# Patient Record
Sex: FEMALE | Race: BLACK OR AFRICAN AMERICAN | NOT HISPANIC OR LATINO | Employment: UNEMPLOYED | URBAN - METROPOLITAN AREA
[De-identification: names, ages, dates, MRNs, and addresses within clinical notes are randomized per-mention and may not be internally consistent; named-entity substitution may affect disease eponyms.]

---

## 2023-11-28 PROBLEM — I10 HYPERTENSION: Status: ACTIVE | Noted: 2023-11-28

## 2023-11-28 PROBLEM — I10 BENIGN ESSENTIAL HYPERTENSION: Status: ACTIVE | Noted: 2023-11-28

## 2023-11-28 PROBLEM — A59.9 TRICHIMONIASIS: Status: ACTIVE | Noted: 2023-11-28

## 2023-11-28 PROBLEM — F17.200 NICOTINE DEPENDENCE: Status: ACTIVE | Noted: 2023-11-28

## 2023-11-28 PROBLEM — N94.6 DYSMENORRHEA: Status: ACTIVE | Noted: 2023-11-28

## 2023-11-28 RX ORDER — FLUOCINONIDE 0.5 MG/G
1 CREAM TOPICAL
COMMUNITY
Start: 2015-05-13 | End: 2023-11-29 | Stop reason: WASHOUT

## 2023-11-28 RX ORDER — IBUPROFEN 600 MG/1
600 TABLET ORAL EVERY 6 HOURS
COMMUNITY
Start: 2022-10-10 | End: 2023-11-29 | Stop reason: SDUPTHER

## 2023-11-28 RX ORDER — METRONIDAZOLE 500 MG/1
500 TABLET ORAL 2 TIMES DAILY
COMMUNITY
Start: 2022-10-13 | End: 2023-11-29 | Stop reason: WASHOUT

## 2023-11-28 RX ORDER — NAPROXEN 500 MG/1
TABLET ORAL
COMMUNITY
End: 2023-11-29 | Stop reason: WASHOUT

## 2023-11-28 RX ORDER — HYDROCHLOROTHIAZIDE 25 MG/1
1 TABLET ORAL DAILY
COMMUNITY
Start: 2021-05-13 | End: 2023-11-29 | Stop reason: WASHOUT

## 2023-11-28 RX ORDER — HYDROCORTISONE 25 MG/G
1 OINTMENT TOPICAL
COMMUNITY
Start: 2015-05-13 | End: 2023-11-29 | Stop reason: WASHOUT

## 2023-11-28 RX ORDER — PNV NO.95/FERROUS FUM/FOLIC AC 28MG-0.8MG
1 TABLET ORAL DAILY
COMMUNITY
Start: 2021-05-13 | End: 2023-11-29 | Stop reason: WASHOUT

## 2023-11-29 ENCOUNTER — OFFICE VISIT (OUTPATIENT)
Dept: PRIMARY CARE | Facility: HOSPITAL | Age: 38
End: 2023-11-29
Payer: COMMERCIAL

## 2023-11-29 VITALS
BODY MASS INDEX: 28.85 KG/M2 | OXYGEN SATURATION: 99 % | HEIGHT: 64 IN | DIASTOLIC BLOOD PRESSURE: 99 MMHG | HEART RATE: 87 BPM | SYSTOLIC BLOOD PRESSURE: 151 MMHG | TEMPERATURE: 97.7 F | WEIGHT: 169 LBS

## 2023-11-29 DIAGNOSIS — Z00.00 HEALTHCARE MAINTENANCE: ICD-10-CM

## 2023-11-29 DIAGNOSIS — R10.32 LEFT LOWER QUADRANT ABDOMINAL PAIN: Primary | ICD-10-CM

## 2023-11-29 DIAGNOSIS — I10 HYPERTENSION, UNSPECIFIED TYPE: ICD-10-CM

## 2023-11-29 LAB
ALBUMIN SERPL BCP-MCNC: 4.4 G/DL (ref 3.4–5)
ALP SERPL-CCNC: 73 U/L (ref 33–110)
ALT SERPL W P-5'-P-CCNC: 17 U/L (ref 7–45)
ANION GAP SERPL CALC-SCNC: 13 MMOL/L (ref 10–20)
APPEARANCE UR: CLEAR
AST SERPL W P-5'-P-CCNC: 20 U/L (ref 9–39)
BILIRUB SERPL-MCNC: 0.5 MG/DL (ref 0–1.2)
BILIRUB UR STRIP.AUTO-MCNC: NEGATIVE MG/DL
BUN SERPL-MCNC: 8 MG/DL (ref 6–23)
CALCIUM SERPL-MCNC: 9.7 MG/DL (ref 8.6–10.6)
CHLORIDE SERPL-SCNC: 106 MMOL/L (ref 98–107)
CHOLEST SERPL-MCNC: 200 MG/DL (ref 0–199)
CHOLESTEROL/HDL RATIO: 3.9
CO2 SERPL-SCNC: 27 MMOL/L (ref 21–32)
COLOR UR: YELLOW
CREAT SERPL-MCNC: 0.73 MG/DL (ref 0.5–1.05)
CREAT UR-MCNC: 66 MG/DL (ref 20–320)
ERYTHROCYTE [DISTWIDTH] IN BLOOD BY AUTOMATED COUNT: 13.7 % (ref 11.5–14.5)
GFR SERPL CREATININE-BSD FRML MDRD: >90 ML/MIN/1.73M*2
GLUCOSE SERPL-MCNC: 97 MG/DL (ref 74–99)
GLUCOSE UR STRIP.AUTO-MCNC: NEGATIVE MG/DL
HCT VFR BLD AUTO: 37.3 % (ref 36–46)
HDLC SERPL-MCNC: 51 MG/DL
HGB BLD-MCNC: 12.4 G/DL (ref 12–16)
KETONES UR STRIP.AUTO-MCNC: NEGATIVE MG/DL
LDLC SERPL CALC-MCNC: ABNORMAL MG/DL
LEUKOCYTE ESTERASE UR QL STRIP.AUTO: NEGATIVE
MCH RBC QN AUTO: 28.8 PG (ref 26–34)
MCHC RBC AUTO-ENTMCNC: 33.2 G/DL (ref 32–36)
MCV RBC AUTO: 87 FL (ref 80–100)
MICROALBUMIN UR-MCNC: <7 MG/L
MICROALBUMIN/CREAT UR: NORMAL MG/G{CREAT}
NITRITE UR QL STRIP.AUTO: NEGATIVE
NON HDL CHOLESTEROL: 149 MG/DL (ref 0–149)
NRBC BLD-RTO: 0 /100 WBCS (ref 0–0)
PH UR STRIP.AUTO: 7 [PH]
PLATELET # BLD AUTO: 224 X10*3/UL (ref 150–450)
POTASSIUM SERPL-SCNC: 4.4 MMOL/L (ref 3.5–5.3)
PREGNANCY TEST URINE, POC: NEGATIVE
PROT SERPL-MCNC: 7.5 G/DL (ref 6.4–8.2)
PROT UR STRIP.AUTO-MCNC: NEGATIVE MG/DL
RBC # BLD AUTO: 4.3 X10*6/UL (ref 4–5.2)
RBC # UR STRIP.AUTO: NEGATIVE /UL
SODIUM SERPL-SCNC: 142 MMOL/L (ref 136–145)
SP GR UR STRIP.AUTO: 1.01
TRIGL SERPL-MCNC: 405 MG/DL (ref 0–149)
TSH SERPL-ACNC: 1.49 MIU/L (ref 0.44–3.98)
UROBILINOGEN UR STRIP.AUTO-MCNC: <2 MG/DL
VLDL: ABNORMAL
WBC # BLD AUTO: 5.9 X10*3/UL (ref 4.4–11.3)

## 2023-11-29 PROCEDURE — 80053 COMPREHEN METABOLIC PANEL: CPT | Performed by: STUDENT IN AN ORGANIZED HEALTH CARE EDUCATION/TRAINING PROGRAM

## 2023-11-29 PROCEDURE — 81003 URINALYSIS AUTO W/O SCOPE: CPT | Performed by: STUDENT IN AN ORGANIZED HEALTH CARE EDUCATION/TRAINING PROGRAM

## 2023-11-29 PROCEDURE — 83036 HEMOGLOBIN GLYCOSYLATED A1C: CPT | Performed by: STUDENT IN AN ORGANIZED HEALTH CARE EDUCATION/TRAINING PROGRAM

## 2023-11-29 PROCEDURE — 80061 LIPID PANEL: CPT | Performed by: STUDENT IN AN ORGANIZED HEALTH CARE EDUCATION/TRAINING PROGRAM

## 2023-11-29 PROCEDURE — 99215 OFFICE O/P EST HI 40 MIN: CPT | Mod: GC,25 | Performed by: STUDENT IN AN ORGANIZED HEALTH CARE EDUCATION/TRAINING PROGRAM

## 2023-11-29 PROCEDURE — 3080F DIAST BP >= 90 MM HG: CPT | Performed by: STUDENT IN AN ORGANIZED HEALTH CARE EDUCATION/TRAINING PROGRAM

## 2023-11-29 PROCEDURE — 82570 ASSAY OF URINE CREATININE: CPT | Performed by: STUDENT IN AN ORGANIZED HEALTH CARE EDUCATION/TRAINING PROGRAM

## 2023-11-29 PROCEDURE — 3077F SYST BP >= 140 MM HG: CPT | Performed by: STUDENT IN AN ORGANIZED HEALTH CARE EDUCATION/TRAINING PROGRAM

## 2023-11-29 PROCEDURE — 36415 COLL VENOUS BLD VENIPUNCTURE: CPT | Performed by: STUDENT IN AN ORGANIZED HEALTH CARE EDUCATION/TRAINING PROGRAM

## 2023-11-29 PROCEDURE — 99215 OFFICE O/P EST HI 40 MIN: CPT | Performed by: STUDENT IN AN ORGANIZED HEALTH CARE EDUCATION/TRAINING PROGRAM

## 2023-11-29 PROCEDURE — 1036F TOBACCO NON-USER: CPT | Performed by: STUDENT IN AN ORGANIZED HEALTH CARE EDUCATION/TRAINING PROGRAM

## 2023-11-29 PROCEDURE — 87800 DETECT AGNT MULT DNA DIREC: CPT | Performed by: STUDENT IN AN ORGANIZED HEALTH CARE EDUCATION/TRAINING PROGRAM

## 2023-11-29 PROCEDURE — 90715 TDAP VACCINE 7 YRS/> IM: CPT | Mod: GC | Performed by: STUDENT IN AN ORGANIZED HEALTH CARE EDUCATION/TRAINING PROGRAM

## 2023-11-29 PROCEDURE — 84443 ASSAY THYROID STIM HORMONE: CPT | Performed by: STUDENT IN AN ORGANIZED HEALTH CARE EDUCATION/TRAINING PROGRAM

## 2023-11-29 PROCEDURE — 85027 COMPLETE CBC AUTOMATED: CPT | Performed by: STUDENT IN AN ORGANIZED HEALTH CARE EDUCATION/TRAINING PROGRAM

## 2023-11-29 RX ORDER — IBUPROFEN 600 MG/1
600 TABLET ORAL 4 TIMES DAILY PRN
Qty: 90 TABLET | Refills: 5 | Status: SHIPPED | OUTPATIENT
Start: 2023-11-29 | End: 2024-03-20 | Stop reason: WASHOUT

## 2023-11-29 RX ORDER — ACETAMINOPHEN 500 MG
1000 TABLET ORAL EVERY 8 HOURS PRN
Qty: 30 TABLET | Refills: 0 | Status: SHIPPED | OUTPATIENT
Start: 2023-11-29 | End: 2023-12-09

## 2023-11-29 RX ORDER — AMLODIPINE BESYLATE 10 MG/1
10 TABLET ORAL DAILY
Qty: 30 TABLET | Refills: 5 | Status: SHIPPED | OUTPATIENT
Start: 2023-11-29 | End: 2024-01-03 | Stop reason: ALTCHOICE

## 2023-11-29 SDOH — ECONOMIC STABILITY: FOOD INSECURITY: WITHIN THE PAST 12 MONTHS, THE FOOD YOU BOUGHT JUST DIDN'T LAST AND YOU DIDN'T HAVE MONEY TO GET MORE.: NEVER TRUE

## 2023-11-29 SDOH — ECONOMIC STABILITY: FOOD INSECURITY: WITHIN THE PAST 12 MONTHS, YOU WORRIED THAT YOUR FOOD WOULD RUN OUT BEFORE YOU GOT MONEY TO BUY MORE.: NEVER TRUE

## 2023-11-29 ASSESSMENT — LIFESTYLE VARIABLES
HOW MANY STANDARD DRINKS CONTAINING ALCOHOL DO YOU HAVE ON A TYPICAL DAY: PATIENT DOES NOT DRINK
HOW OFTEN DO YOU HAVE A DRINK CONTAINING ALCOHOL: NEVER
HOW OFTEN DO YOU HAVE SIX OR MORE DRINKS ON ONE OCCASION: NEVER
SKIP TO QUESTIONS 9-10: 1
AUDIT-C TOTAL SCORE: 0

## 2023-11-29 ASSESSMENT — ENCOUNTER SYMPTOMS
OCCASIONAL FEELINGS OF UNSTEADINESS: 0
DEPRESSION: 0
LOSS OF SENSATION IN FEET: 0

## 2023-11-29 ASSESSMENT — PATIENT HEALTH QUESTIONNAIRE - PHQ9
SUM OF ALL RESPONSES TO PHQ9 QUESTIONS 1 & 2: 0
2. FEELING DOWN, DEPRESSED OR HOPELESS: NOT AT ALL
1. LITTLE INTEREST OR PLEASURE IN DOING THINGS: NOT AT ALL

## 2023-11-29 NOTE — PATIENT INSTRUCTIONS
Sammi Ms Koo     It was nice to meet you. We have ordered blood, urine and imaging to better understand your abdominal pain. Some of these tests are also to assess your health moving forward. Below are instructions     -Blood and urine tests in office today   -Please schedule follow up with gynecology and the vaginal imaging. Number is listed here  -Stop your water pill and start amlodipine instead.     [facility]

## 2023-11-29 NOTE — PROGRESS NOTES
Reason for Visit - Establish Care; Abd Pain     HPI: 38 YOF presenting to [facility] to establish care and address abdominal pain. PMHx notable for HTN (intermittent hydrochlorothiazide use), pre-diabetes (A1C 6.0% ), hyperTG?, and trichamonas vaginalis. Reports abd pain is LLQ and ongoing for 3 months; occurs only with intercourse and lasts 20 minutes with improvement without intervention. Denies any recent changes during intercourse. No pain on palpation and does not exert herself in terms of exercise to see if pain brought on in other scenarios. Sexually active with boyfriend and intermittently uses male-condoms. Has not missed any menstrual periods in the last several months or experienced any irregular menstrual bleeding. Denies frequency/urgency/dysuria/hematuria, N/V or fevers.       HTN - prescribed hydrochlorothiazide however only takes intermittently because it makes her dizzy.     Vitals IO - hypertensive 150/100, BMI 29    Medication: hydrochlorothiazide     PMHx/PSHx: As above,     Social Hx: Currently employed (producing sink tops). Lives with boyfriend. Smoked tobacco and drinks socially however stopped smoking recently because it made her feel unusually.     Family History: HTN, Breast Cancer - Maternal Grandmother (age of 70), Maternal Aunt (elderly however did not know specific age)    Allergies: PCN (does not know reaction)    Current Outpatient Medications on File Prior to Visit   Medication Sig Dispense Refill    [DISCONTINUED] fluocinonide 0.05 % cream Apply 1 Application topically.      [DISCONTINUED] hydrocortisone 2.5 % ointment Apply 1 Application topically.      [DISCONTINUED] metroNIDAZOLE (Flagyl) 500 mg tablet Take 1 tablet (500 mg) by mouth twice a day.      [DISCONTINUED] prenatal vitamin, iron-folic, (Prenatal) 27 mg iron-800 mcg folic acid tablet Take 1 tablet by mouth once daily.      blood pressure test kit (BLOOD PRESSURE MONITOR [facility]) 1 each once daily.      [DISCONTINUED]  hydroCHLOROthiazide (HYDRODiuril) 25 mg tablet Take 1 tablet (25 mg) by mouth once daily.      [DISCONTINUED] ibuprofen (IBU) 600 mg tablet Take 1 tablet (600 mg) by mouth every 6 hours.      [DISCONTINUED] naproxen (Naprosyn) 500 mg tablet Take by mouth.       No current facility-administered medications on file prior to visit.      Physical Exam   Const - appears well  CV - S1/S2, no mrg  GI - soft, normoactive BS, NT to palpation including LLQ  Pulm - CTAB  Ext - WWP  MSK- negative straight leg raise, no pain on extension or flexion of lower extremities     A/P:     38 YOF with PMHx notable for HTN (intermittent hydrochlorothiazide use), pre-diabetes (A1C 6.0% 2021), hyperTG?, and trichamonas vaginalis presenting for abdominal pain following intercourse for several months. Current differential is pregnancy vs. PID/infection vs. Mass/Torsion. Pregnancy and infectious etiology are supported by intermittent condom use and hx of STD where as symptom onset with intercourse supports torsion process. Low concern for malignancy given benign family history and age; would also expect more insidious symptoms.     #Abdominal/Pelvic Pain   ::LLQ, occurring with intercourse  ::Physical exam benign  -CBC, CMP, U/A, POC Urine pregnancy test, G+C urine antigen    -Transvaginal U/S ordered to assess for adnexal process  -Tylenol and Ibuprofen for symptoms (instructed to only use one)  -Gyn referral sent     #HTN, suspect essential   ::Contributing risk factors  - tobacco use, EToH intake, and weight (BMI ~30)   :: Poor compliance with hydrochlorothiazide 25 mg largely due to reported symptoms  -Started amlodipine 10 mg  -Hemoglobin A1C and protein albumin/creatinine ratio to assess need for ACEi/ARB   -Instructed patient on lifestyle modification    #Prediabetes (A1C 6.0% 2021)  ::Modifiable risk factors as stated above  -Repeat A1C today, if still in the pre-diabetic range will broach initiating Metformin particularly given  concern of concomitant hyper-TG (below)    #HyperTG?  :: Previously >1200 (2021)  ::No hx of associated sequelae including acute pancreatitis, ASCVD  -Repeat lipid panel IO today (non-fasting)  -Management will depend on severity and if diabetic. If TG >150 and diabetic then consider initiation of IPE or EPA  -Aggressive management of possible contributing factors - EToH use, hyperglycemia     # Health Maintenance  Cancer Screening  - Colonoscopy: not indicated   - Mammogram: not indicated   - Pap + HPV: last checked 2018 (gyn referral today)  Cardiovascular Screening   - ASCVD risk score: ~10% based on 2021 lipid panel; repeat lipid panel   Infectious disease  - HIV/Syphilis/Hepatitis C: negative 2022  Vaccines  - Influenza: Declined IO  - Tdap: Completed today.   - Pneumonia: not indicated   - COVID:  not discussed     ---------      Addendum (12/1):   Received results of above urine and serum testing. Spoke with patient on the phone :    1) Chlamydia infection - informed of positive test and need for anti-microbial treatment. Doxy 100 mg BiD for 14 days (concern for PID). Also educated that her boyfriend would require treatment to avoid re-infection. I expressed he should make appt with his PCP or our clinic     2) HyperTG/Pre-diabetes: Started Metformin 500 mg given persistent prediabetic range and also to better control moderately elevated hyperTG. Also informed to cut back EToH use. Patient amenable to metformin and possible diarrhea. No indication for EPA/IPE (per reduce-it). ASCVD calculated to be ~10%; agreeable to CAC scoring to adjudicate risk and potentially avoid statin prescription.  CAC > 100 would warrant statin vs not with CAC 0. Otherwise patient-provider discussion.

## 2023-11-30 LAB
EST. AVERAGE GLUCOSE BLD GHB EST-MCNC: 123 MG/DL
HBA1C MFR BLD: 5.9 %

## 2023-12-01 DIAGNOSIS — R73.03 PREDIABETES: ICD-10-CM

## 2023-12-01 DIAGNOSIS — E78.5 HYPERLIPIDEMIA, UNSPECIFIED HYPERLIPIDEMIA TYPE: ICD-10-CM

## 2023-12-01 DIAGNOSIS — A74.9 CHLAMYDIA INFECTION: Primary | ICD-10-CM

## 2023-12-01 LAB
C TRACH RRNA SPEC QL NAA+PROBE: POSITIVE
N GONORRHOEA DNA SPEC QL PROBE+SIG AMP: NEGATIVE

## 2023-12-01 RX ORDER — METFORMIN HYDROCHLORIDE 500 MG/1
500 TABLET, EXTENDED RELEASE ORAL
Qty: 30 TABLET | Refills: 11 | Status: SHIPPED | OUTPATIENT
Start: 2023-12-01 | End: 2024-03-20 | Stop reason: SDUPTHER

## 2023-12-01 RX ORDER — DOXYCYCLINE 100 MG/1
100 CAPSULE ORAL 2 TIMES DAILY
Qty: 28 CAPSULE | Refills: 0 | Status: SHIPPED | OUTPATIENT
Start: 2023-12-01 | End: 2023-12-15

## 2023-12-01 NOTE — PROGRESS NOTES
I saw and evaluated the patient. I personally obtained the key and critical portions of the history and physical exam or was physically present for key and critical portions performed by the resident/fellow. I reviewed the resident/fellow's documentation and discussed the patient with the resident/fellow. I agree with the resident/fellow's medical decision making as documented in the note.    Baljinder Peralat MD MPH

## 2023-12-05 ENCOUNTER — HOSPITAL ENCOUNTER (OUTPATIENT)
Dept: RADIOLOGY | Facility: HOSPITAL | Age: 38
Discharge: HOME | End: 2023-12-05
Payer: COMMERCIAL

## 2023-12-05 DIAGNOSIS — R10.32 LEFT LOWER QUADRANT ABDOMINAL PAIN: ICD-10-CM

## 2023-12-05 PROCEDURE — 76856 US EXAM PELVIC COMPLETE: CPT | Performed by: RADIOLOGY

## 2023-12-05 PROCEDURE — 76830 TRANSVAGINAL US NON-OB: CPT

## 2023-12-05 PROCEDURE — 76830 TRANSVAGINAL US NON-OB: CPT | Performed by: RADIOLOGY

## 2023-12-14 ENCOUNTER — APPOINTMENT (OUTPATIENT)
Dept: OBSTETRICS AND GYNECOLOGY | Facility: CLINIC | Age: 38
End: 2023-12-14
Payer: COMMERCIAL

## 2023-12-30 ENCOUNTER — HOSPITAL ENCOUNTER (EMERGENCY)
Facility: HOSPITAL | Age: 38
Discharge: HOME | End: 2023-12-31
Attending: EMERGENCY MEDICINE
Payer: COMMERCIAL

## 2023-12-30 DIAGNOSIS — I47.10 SVT (SUPRAVENTRICULAR TACHYCARDIA) (CMS-HCC): Primary | ICD-10-CM

## 2023-12-30 PROCEDURE — 96365 THER/PROPH/DIAG IV INF INIT: CPT

## 2023-12-30 PROCEDURE — 84132 ASSAY OF SERUM POTASSIUM: CPT

## 2023-12-30 PROCEDURE — 99284 EMERGENCY DEPT VISIT MOD MDM: CPT | Performed by: EMERGENCY MEDICINE

## 2023-12-30 PROCEDURE — 93010 ELECTROCARDIOGRAM REPORT: CPT | Performed by: EMERGENCY MEDICINE

## 2023-12-30 PROCEDURE — 99285 EMERGENCY DEPT VISIT HI MDM: CPT | Mod: 25

## 2023-12-30 PROCEDURE — 99285 EMERGENCY DEPT VISIT HI MDM: CPT | Performed by: EMERGENCY MEDICINE

## 2023-12-30 PROCEDURE — 96375 TX/PRO/DX INJ NEW DRUG ADDON: CPT

## 2023-12-30 ASSESSMENT — COLUMBIA-SUICIDE SEVERITY RATING SCALE - C-SSRS
1. IN THE PAST MONTH, HAVE YOU WISHED YOU WERE DEAD OR WISHED YOU COULD GO TO SLEEP AND NOT WAKE UP?: NO
2. HAVE YOU ACTUALLY HAD ANY THOUGHTS OF KILLING YOURSELF?: NO
6. HAVE YOU EVER DONE ANYTHING, STARTED TO DO ANYTHING, OR PREPARED TO DO ANYTHING TO END YOUR LIFE?: NO

## 2023-12-31 ENCOUNTER — ANCILLARY PROCEDURE (OUTPATIENT)
Dept: EMERGENCY MEDICINE | Facility: HOSPITAL | Age: 38
End: 2023-12-31
Payer: COMMERCIAL

## 2023-12-31 VITALS
HEART RATE: 107 BPM | TEMPERATURE: 98.2 F | RESPIRATION RATE: 12 BRPM | DIASTOLIC BLOOD PRESSURE: 99 MMHG | SYSTOLIC BLOOD PRESSURE: 134 MMHG | OXYGEN SATURATION: 98 %

## 2023-12-31 PROBLEM — I47.10 SVT (SUPRAVENTRICULAR TACHYCARDIA) (CMS-HCC): Status: ACTIVE | Noted: 2023-12-31

## 2023-12-31 LAB
ALBUMIN SERPL BCP-MCNC: 4.6 G/DL (ref 3.4–5)
ALP SERPL-CCNC: 83 U/L (ref 33–110)
ALT SERPL W P-5'-P-CCNC: 20 U/L (ref 7–45)
ANION GAP BLDV CALCULATED.4IONS-SCNC: 19 MMOL/L (ref 10–25)
ANION GAP SERPL CALC-SCNC: 16 MMOL/L (ref 10–20)
AST SERPL W P-5'-P-CCNC: 25 U/L (ref 9–39)
ATRIAL RATE: 105 BPM
BASE EXCESS BLDV CALC-SCNC: -2.9 MMOL/L (ref -2–3)
BASOPHILS # BLD AUTO: 0.08 X10*3/UL (ref 0–0.1)
BASOPHILS NFR BLD AUTO: 0.7 %
BILIRUB SERPL-MCNC: 0.6 MG/DL (ref 0–1.2)
BODY TEMPERATURE: 37 DEGREES CELSIUS
BUN SERPL-MCNC: 14 MG/DL (ref 6–23)
CA-I BLDV-SCNC: 1.13 MMOL/L (ref 1.1–1.33)
CALCIUM SERPL-MCNC: 9.5 MG/DL (ref 8.6–10.6)
CHLORIDE BLDV-SCNC: 107 MMOL/L (ref 98–107)
CHLORIDE SERPL-SCNC: 108 MMOL/L (ref 98–107)
CO2 SERPL-SCNC: 21 MMOL/L (ref 21–32)
CREAT SERPL-MCNC: 0.68 MG/DL (ref 0.5–1.05)
EOSINOPHIL # BLD AUTO: 0.12 X10*3/UL (ref 0–0.7)
EOSINOPHIL NFR BLD AUTO: 1 %
ERYTHROCYTE [DISTWIDTH] IN BLOOD BY AUTOMATED COUNT: 13.2 % (ref 11.5–14.5)
GFR SERPL CREATININE-BSD FRML MDRD: >90 ML/MIN/1.73M*2
GLUCOSE BLDV-MCNC: 120 MG/DL (ref 74–99)
GLUCOSE SERPL-MCNC: 120 MG/DL (ref 74–99)
HCO3 BLDV-SCNC: 20.9 MMOL/L (ref 22–26)
HCT VFR BLD AUTO: 38 % (ref 36–46)
HCT VFR BLD EST: 41 % (ref 36–46)
HGB BLD-MCNC: 13.9 G/DL (ref 12–16)
HGB BLDV-MCNC: 13.7 G/DL (ref 12–16)
HOLD SPECIMEN: NORMAL
HOLD SPECIMEN: NORMAL
IMM GRANULOCYTES # BLD AUTO: 0.04 X10*3/UL (ref 0–0.7)
IMM GRANULOCYTES NFR BLD AUTO: 0.3 % (ref 0–0.9)
LACTATE BLDV-SCNC: 2 MMOL/L (ref 0.4–2)
LYMPHOCYTES # BLD AUTO: 5.94 X10*3/UL (ref 1.2–4.8)
LYMPHOCYTES NFR BLD AUTO: 49.1 %
MAGNESIUM SERPL-MCNC: 2.02 MG/DL (ref 1.6–2.4)
MCH RBC QN AUTO: 30.2 PG (ref 26–34)
MCHC RBC AUTO-ENTMCNC: 36.6 G/DL (ref 32–36)
MCV RBC AUTO: 82 FL (ref 80–100)
MONOCYTES # BLD AUTO: 0.87 X10*3/UL (ref 0.1–1)
MONOCYTES NFR BLD AUTO: 7.2 %
NEUTROPHILS # BLD AUTO: 5.04 X10*3/UL (ref 1.2–7.7)
NEUTROPHILS NFR BLD AUTO: 41.7 %
NRBC BLD-RTO: 0 /100 WBCS (ref 0–0)
OXYHGB MFR BLDV: 77.9 % (ref 45–75)
P AXIS: 56 DEGREES
P OFFSET: 216 MS
P ONSET: 158 MS
PCO2 BLDV: 33 MM HG (ref 41–51)
PH BLDV: 7.41 PH (ref 7.33–7.43)
PLATELET # BLD AUTO: 248 X10*3/UL (ref 150–450)
PO2 BLDV: 51 MM HG (ref 35–45)
POLYCHROMASIA BLD QL SMEAR: NORMAL
POTASSIUM BLDV-SCNC: 3.7 MMOL/L (ref 3.5–5.3)
POTASSIUM SERPL-SCNC: 3.5 MMOL/L (ref 3.5–5.3)
PR INTERVAL: 134 MS
PROT SERPL-MCNC: 8.3 G/DL (ref 6.4–8.2)
Q ONSET: 225 MS
QRS COUNT: 17 BEATS
QRS DURATION: 80 MS
QT INTERVAL: 380 MS
QTC CALCULATION(BAZETT): 502 MS
QTC FREDERICIA: 457 MS
R AXIS: 55 DEGREES
RBC # BLD AUTO: 4.61 X10*6/UL (ref 4–5.2)
RBC MORPH BLD: NORMAL
SAO2 % BLDV: 81 % (ref 45–75)
SODIUM BLDV-SCNC: 143 MMOL/L (ref 136–145)
SODIUM SERPL-SCNC: 141 MMOL/L (ref 136–145)
T AXIS: 12 DEGREES
T OFFSET: 415 MS
TARGETS BLD QL SMEAR: NORMAL
VENTRICULAR RATE: 105 BPM
WBC # BLD AUTO: 12.1 X10*3/UL (ref 4.4–11.3)

## 2023-12-31 PROCEDURE — 2500000004 HC RX 250 GENERAL PHARMACY W/ HCPCS (ALT 636 FOR OP/ED): Mod: SE | Performed by: EMERGENCY MEDICINE

## 2023-12-31 PROCEDURE — 83735 ASSAY OF MAGNESIUM: CPT | Performed by: STUDENT IN AN ORGANIZED HEALTH CARE EDUCATION/TRAINING PROGRAM

## 2023-12-31 PROCEDURE — 93005 ELECTROCARDIOGRAM TRACING: CPT

## 2023-12-31 PROCEDURE — 2500000004 HC RX 250 GENERAL PHARMACY W/ HCPCS (ALT 636 FOR OP/ED): Mod: SE | Performed by: STUDENT IN AN ORGANIZED HEALTH CARE EDUCATION/TRAINING PROGRAM

## 2023-12-31 PROCEDURE — 80053 COMPREHEN METABOLIC PANEL: CPT | Performed by: STUDENT IN AN ORGANIZED HEALTH CARE EDUCATION/TRAINING PROGRAM

## 2023-12-31 PROCEDURE — 85025 COMPLETE CBC W/AUTO DIFF WBC: CPT | Performed by: STUDENT IN AN ORGANIZED HEALTH CARE EDUCATION/TRAINING PROGRAM

## 2023-12-31 PROCEDURE — 2500000005 HC RX 250 GENERAL PHARMACY W/O HCPCS: Mod: SE | Performed by: STUDENT IN AN ORGANIZED HEALTH CARE EDUCATION/TRAINING PROGRAM

## 2023-12-31 PROCEDURE — 36415 COLL VENOUS BLD VENIPUNCTURE: CPT | Performed by: STUDENT IN AN ORGANIZED HEALTH CARE EDUCATION/TRAINING PROGRAM

## 2023-12-31 RX ORDER — MAGNESIUM SULFATE HEPTAHYDRATE 40 MG/ML
2 INJECTION, SOLUTION INTRAVENOUS ONCE
Status: COMPLETED | OUTPATIENT
Start: 2023-12-31 | End: 2023-12-31

## 2023-12-31 RX ORDER — POTASSIUM CHLORIDE 20 MEQ/1
40 TABLET, EXTENDED RELEASE ORAL ONCE
Status: COMPLETED | OUTPATIENT
Start: 2023-12-31 | End: 2023-12-31

## 2023-12-31 RX ORDER — MAGNESIUM SULFATE HEPTAHYDRATE 40 MG/ML
INJECTION, SOLUTION INTRAVENOUS
Status: COMPLETED
Start: 2023-12-31 | End: 2023-12-31

## 2023-12-31 RX ORDER — DILTIAZEM HYDROCHLORIDE 5 MG/ML
20 INJECTION INTRAVENOUS ONCE
Status: COMPLETED | OUTPATIENT
Start: 2023-12-31 | End: 2023-12-31

## 2023-12-31 RX ORDER — DILTIAZEM HYDROCHLORIDE 5 MG/ML
INJECTION INTRAVENOUS
Status: COMPLETED
Start: 2023-12-31 | End: 2023-12-31

## 2023-12-31 RX ADMIN — MAGNESIUM SULFATE HEPTAHYDRATE 2 G: 40 INJECTION, SOLUTION INTRAVENOUS at 00:27

## 2023-12-31 RX ADMIN — POTASSIUM CHLORIDE 40 MEQ: 1500 TABLET, EXTENDED RELEASE ORAL at 01:56

## 2023-12-31 RX ADMIN — SODIUM CHLORIDE, SODIUM LACTATE, POTASSIUM CHLORIDE, AND CALCIUM CHLORIDE 1000 ML: 600; 310; 30; 20 INJECTION, SOLUTION INTRAVENOUS at 00:10

## 2023-12-31 RX ADMIN — DILTIAZEM HYDROCHLORIDE 20 MG: 5 INJECTION INTRAVENOUS at 00:05

## 2023-12-31 ASSESSMENT — PAIN SCALES - GENERAL: PAINLEVEL_OUTOF10: 0 - NO PAIN

## 2023-12-31 NOTE — ED TRIAGE NOTES
Patient presents to the ED for a rapid heart rate. Patient states this started 30 mins ago. Endorsing shortness of breath, denies chest pain.

## 2023-12-31 NOTE — ED PROVIDER NOTES
HPI  Patient is a 38-year-old female presented to the emergency department for an irregular heartbeat.  States that she was drinking tequila earlier today when she suddenly felt short of breath.  Upon EMS arrival she was noticed to have a heart rate in the 170s to 180s.  Patient states that she has never had a heart rate that fast does not have any heart rhythm abnormalities far she knows.  She however was referred for an echo, however has not done yet.  Denies any chest pain, subjective fevers, cough, headache, or vision change.    Physical Exam  VITALS: Vital signs reviewed in nursing triage note, EMR flow sheets, and at patient's bedside  CONSTITUTIONAL: Well-appearing, in no apparent distress  HEAD: NCAT  EYES: PERRL, EOMI  NECK: Supple, non-tender, full ROM  CARD: Tachycardic with regular rhythm no m/r/g, no JVD  RESP: LCTAB, speaking full sentences, no increased work of breathing, no use of accessory respiratory muscles  ABD: Bowel sounds present, non-distended, soft and non-tender, no palpable organomegaly, no masses, no guarding or rebound tenderness  BACK: No vertebral point or CVA tenderness, no obvious bony deformities, no spinal step-offs   EXT: Normal ROM in all four extremities, 2+ radial and DP pulses bilaterally, no edema  SKIN: Dry with appropriate turgor, no apparent rashes, suspicious lesions, or masses   NEURO: CNs II-XII grossly intact, AAOx4, sensation intact bilaterally, strength 5/5 on UEs and LEs bilaterally, speech is fluent and comprehensible  PSYCH: Appropriate mood and affect, no HI/SI, not responding to internal stimuli    Vitals:    12/31/23 0150   BP: (!) 134/99   Pulse: 107   Resp: 12   Temp: 36.8 °C (98.2 °F)   SpO2: 98%       Assessment/Plan/MDM  Patient clinical stable upon arrival to the emergency department.  Initial heart rate noted to be in the 180s, however hemodynamically stable.  EKG consistent with SVT.  Given her hemodynamic stability otherwise, was provided with  diltiazem 20 mg IV, 1 L bolus of LR, as well as magnesium 2 g IV.  This did convert back to normal sinus rhythm which was again captured on EKG.  Patient remained in normal sinus rhythm while full laboratory workup was sent, which did not yield any significant abnormalities.  Given patient's ability to maintain in normal sinus rhythm, as well as overall laboratory workup, do believe that she is stable to be discharged at this time.  She is provided with cardiology follow-up and discharged in stable condition.    Results  *See section(s) entitled ``Lab Results´´, ``Diagnostic Imaging Results Review´´ for entirety.  Notable results listed below  - Labs: I independently interpreted as laboratory workup unremarkable    ED Course as of 12/31/23 0511   Sun Dec 31, 2023   0510 EKG time 2357, SVT, rate 180, all intervals normal length, normal axis, no ST elevations, no T wave abnormalities, SVT, compared to EKG on 12/13/2020 [JV]   0510 Subsequent EKG time 0044, normal sinus rhythm, rate 105, QTc interval 502 ms, all other intervals normal length, normal axis, no ST elevations, isolated T wave versions appreciated lead V3, no longer in SVT when compared to EKG from earlier today [JV]      ED Course User Index  [JV] Davin Lopez MD         Diagnoses as of 12/31/23 0511   SVT (supraventricular tachycardia)      Clinical Impression  SVT    Dispo  Discharge home    Home: I discussed the differential, results and discharge plan with the patient and/or family/friend/caregiver if present. I emphasized the importance of follow-up with the physician I referred them to in the timeframe recommended. I explained reasons for the patient to return to the Emergency Department. Questions were addressed. They understand return precautions and discharge instructions. The patient and/or family/friend/caregiver expressed understanding and agreement with assessment/plan.     Patient seen and discussed with attending physician   [place].    Davin Lopez MD  Emergency Medicine, PGY-3    Was dictated using Dragon dictation. Please excuse any errors found in the note.     Davin Lopez MD  Resident  12/31/23 0532

## 2023-12-31 NOTE — ED PROCEDURE NOTE
Procedure  Critical Care    Performed by: Jamar Riojas MD  Authorized by: Jamar Riojas MD    Critical care provider statement:     Critical care time (minutes):  23    Critical care was necessary to treat or prevent imminent or life-threatening deterioration of the following conditions: tachyarrhythmia.    Critical care was time spent personally by me on the following activities:  Blood draw for specimens, evaluation of patient's response to treatment, examination of patient, ordering and performing treatments and interventions, ordering and review of laboratory studies and re-evaluation of patient's condition               Jamar Riojas MD  12/31/23 0536

## 2024-01-03 ENCOUNTER — OFFICE VISIT (OUTPATIENT)
Dept: CARDIOLOGY | Facility: HOSPITAL | Age: 39
End: 2024-01-03
Payer: COMMERCIAL

## 2024-01-03 VITALS
WEIGHT: 169.1 LBS | HEART RATE: 88 BPM | DIASTOLIC BLOOD PRESSURE: 80 MMHG | BODY MASS INDEX: 27.18 KG/M2 | SYSTOLIC BLOOD PRESSURE: 135 MMHG | HEIGHT: 66 IN

## 2024-01-03 DIAGNOSIS — I10 HYPERTENSION, UNSPECIFIED TYPE: ICD-10-CM

## 2024-01-03 DIAGNOSIS — R94.31 ABNORMAL EKG: Primary | ICD-10-CM

## 2024-01-03 PROCEDURE — 3079F DIAST BP 80-89 MM HG: CPT | Performed by: INTERNAL MEDICINE

## 2024-01-03 PROCEDURE — 1036F TOBACCO NON-USER: CPT | Performed by: INTERNAL MEDICINE

## 2024-01-03 PROCEDURE — 99215 OFFICE O/P EST HI 40 MIN: CPT | Performed by: INTERNAL MEDICINE

## 2024-01-03 PROCEDURE — 93005 ELECTROCARDIOGRAM TRACING: CPT | Performed by: INTERNAL MEDICINE

## 2024-01-03 PROCEDURE — 3075F SYST BP GE 130 - 139MM HG: CPT | Performed by: INTERNAL MEDICINE

## 2024-01-03 PROCEDURE — 99205 OFFICE O/P NEW HI 60 MIN: CPT | Performed by: INTERNAL MEDICINE

## 2024-01-03 PROCEDURE — 93010 ELECTROCARDIOGRAM REPORT: CPT | Performed by: INTERNAL MEDICINE

## 2024-01-03 ASSESSMENT — ENCOUNTER SYMPTOMS
OCCASIONAL FEELINGS OF UNSTEADINESS: 0
LOSS OF SENSATION IN FEET: 0
DEPRESSION: 0

## 2024-01-03 NOTE — PROGRESS NOTES
Chief complaint:  I am seeing patient in consultation for Dr. Conner and Dr. Riojas following a recent bout of SVT.    HPI:  Patient is a 38-year-old female.  She has a history of hypertension.  She also appears to have some diabetes and an elevated cholesterol level.  She has been not been compliant with her amlodipine or metformin the best I can tell.  She was a smoker but has cut down to about 2 cigarettes/day.  There does appear to be a concerning family history of premature coronary disease.    Patient has not seen a cardiologist previously and has not had any prior cardiovascular testing.  She denies any prior history of MIs or strokes.  There is no history of rheumatic fever or history of heart murmur.  She admits to being rather sedentary but denies any exertionally mediated chest discomfort, dyspnea or claudication.  She denies any orthopnea or PND.    She recently was seen in the emergency room with dyspnea and a tachycardia which appeared to be SVT although I could not review the EKG in MUSE.  She apparently received some diltiazem and reverted to sinus rhythm.  This episode appeared to occur with probable heavy alcohol intake.  She comes in today to establish cardiology follow-up.    Of note, patient does not have any prior history of palpitations.  There is no family history of sudden death or syncope.  She has not had any recurrent palpitation spells since her isolated episode of SVT.  She continues to remain off her amlodipine and metformin.    PMH/PSH:  Past medical history is remarkable for the hypertension and diabetes.  She also appears to have some asthma and uses an inhaler occasionally.  Only surgery includes a .    Allergies: Penicillin.  EtOH: Moderate use.  Caffeine: Limited use.    FH/SH:  Patient is a 38-year-old unmarried female.  She has 3 children ages 22, 19 and 16.  She works as a .    Review of systems:  All other systems have been reviewed and are negative  for complaint.    Physical exam:  Vital signs:  P-88  BP-135/80 (blood pressure was checked in both arms and was equal bilaterally at 135/80).  General: Alert, pleasant young female in no distress.  HEENT: Normal EOMs without thyromegaly or lymphadenopathy.  Lungs: Clear with good air exchange and no crackles or wheezing.  Cardiac: Normal JVP and carotid upstrokes without bruit.  There is a normal S1 and S2 without murmur rub or S3.  Abdomen: Nontender with normal bowel sounds and no bruits.  Extremities: No edema.  Skin: No acute rash.  Neuro: Intact without focal motor deficit.  Vascular: Normal brachial, radial and ulnar pulses on the right.  Normal brachial and ulnar pulse on the left with a diminished left radial pulse.  There are normal femoral and popliteal pulses bilaterally.    EKG: Normal sinus rhythm.  Mildly prolonged QT.  No acute ST or T wave changes.    Lipid panel: Cholesterol-200, HDL-51, LDL--, TG-405.    Impression/plan:  Patient presents with a recent bout of SVT by report.  I will try to track down her incident EKG, but I am not sure I will be able to do this.  I did instruct her to continue to limit her caffeine intake and to cut down significantly on her alcohol intake.  She knows to alert me for any recurrent sustained palpitation spells.  I will hold off on a monitor at this time as she does not have much in the way of ambient palpitation symptomatology.    I will plan on setting her up for an echocardiogram just to make sure we are not missing any underlying structural disease of concern.    Her lipid panel does appear to be somewhat adverse with a markedly elevated triglyceride.  I will probably recheck this in the near future and will sort out if we should consider coronary artery calcium score to sort out whether we should consider statin therapy particularly given her family history of premature coronary disease.    I will see her back in follow-up in 4 to 6 weeks and make further  recommendations pending her symptom status as well as her echo results.  She knows to call for any intercurrent concerns.  I did tell her it was fine to hold off on taking her amlodipine at this time as her blood pressure reading is good.  I did encourage her to work with her PCP on how essential the metformin is since she is anxious to minimize her medications as much as possible.    She also knows the importance of getting off the cigarettes completely.      Patient instructions:    Obtain your echocardiogram when scheduled.    Reduce your caffeine and alcohol intake.    Get off the cigarettes completely.    Return to clinic in 4 to 6 weeks.    Call for any intercurrent concerns particularly any recurrent palpitations.

## 2024-01-04 LAB
ATRIAL RATE: 88 BPM
P AXIS: 64 DEGREES
P OFFSET: 219 MS
P ONSET: 167 MS
PR INTERVAL: 118 MS
Q ONSET: 226 MS
QRS COUNT: 14 BEATS
QRS DURATION: 82 MS
QT INTERVAL: 394 MS
QTC CALCULATION(BAZETT): 476 MS
QTC FREDERICIA: 447 MS
R AXIS: 57 DEGREES
T AXIS: 52 DEGREES
T OFFSET: 423 MS
VENTRICULAR RATE: 88 BPM

## 2024-01-22 ENCOUNTER — HOSPITAL ENCOUNTER (OUTPATIENT)
Dept: CARDIOLOGY | Facility: HOSPITAL | Age: 39
Discharge: HOME | End: 2024-01-22
Payer: COMMERCIAL

## 2024-01-22 VITALS
BODY MASS INDEX: 27.16 KG/M2 | WEIGHT: 169 LBS | SYSTOLIC BLOOD PRESSURE: 135 MMHG | HEIGHT: 66 IN | DIASTOLIC BLOOD PRESSURE: 80 MMHG

## 2024-01-22 DIAGNOSIS — R94.31 ABNORMAL EKG: ICD-10-CM

## 2024-01-22 DIAGNOSIS — I10 HYPERTENSION, UNSPECIFIED TYPE: ICD-10-CM

## 2024-01-22 PROCEDURE — 93306 TTE W/DOPPLER COMPLETE: CPT | Performed by: INTERNAL MEDICINE

## 2024-01-22 PROCEDURE — 93306 TTE W/DOPPLER COMPLETE: CPT

## 2024-01-23 LAB
AORTIC VALVE MEAN GRADIENT: 5 MMHG
AORTIC VALVE PEAK VELOCITY: 1.48 M/S
AV PEAK GRADIENT: 8.8 MMHG
AVA (PEAK VEL): 1.12 CM2
AVA (VTI): 1.31 CM2
EJECTION FRACTION APICAL 4 CHAMBER: 48.6
EJECTION FRACTION: 57 %
LEFT ATRIUM VOLUME AREA LENGTH INDEX BSA: 26.6 ML/M2
LEFT VENTRICLE INTERNAL DIMENSION DIASTOLE: 4.9 CM (ref 3.5–6)
LEFT VENTRICULAR OUTFLOW TRACT DIAMETER: 1.95 CM
MITRAL VALVE E/A RATIO: 0.94
MITRAL VALVE E/E' RATIO: 7.3
RIGHT VENTRICLE FREE WALL PEAK S': 12.7 CM/S
TRICUSPID ANNULAR PLANE SYSTOLIC EXCURSION: 2.4 CM

## 2024-03-20 ENCOUNTER — OFFICE VISIT (OUTPATIENT)
Dept: PRIMARY CARE | Facility: HOSPITAL | Age: 39
End: 2024-03-20
Payer: COMMERCIAL

## 2024-03-20 VITALS
WEIGHT: 169 LBS | HEART RATE: 86 BPM | BODY MASS INDEX: 28.85 KG/M2 | HEIGHT: 64 IN | SYSTOLIC BLOOD PRESSURE: 162 MMHG | OXYGEN SATURATION: 97 % | TEMPERATURE: 96.7 F | DIASTOLIC BLOOD PRESSURE: 105 MMHG

## 2024-03-20 DIAGNOSIS — Z71.6 TOBACCO ABUSE COUNSELING: ICD-10-CM

## 2024-03-20 DIAGNOSIS — I10 HYPERTENSION, UNSPECIFIED TYPE: Primary | ICD-10-CM

## 2024-03-20 DIAGNOSIS — R73.03 PREDIABETES: ICD-10-CM

## 2024-03-20 DIAGNOSIS — E78.1 HYPERTRIGLYCERIDEMIA: ICD-10-CM

## 2024-03-20 DIAGNOSIS — Z00.00 HEALTHCARE MAINTENANCE: ICD-10-CM

## 2024-03-20 PROCEDURE — 1036F TOBACCO NON-USER: CPT | Performed by: STUDENT IN AN ORGANIZED HEALTH CARE EDUCATION/TRAINING PROGRAM

## 2024-03-20 PROCEDURE — 3077F SYST BP >= 140 MM HG: CPT | Performed by: STUDENT IN AN ORGANIZED HEALTH CARE EDUCATION/TRAINING PROGRAM

## 2024-03-20 PROCEDURE — 99215 OFFICE O/P EST HI 40 MIN: CPT | Performed by: STUDENT IN AN ORGANIZED HEALTH CARE EDUCATION/TRAINING PROGRAM

## 2024-03-20 PROCEDURE — 99215 OFFICE O/P EST HI 40 MIN: CPT | Mod: GC | Performed by: STUDENT IN AN ORGANIZED HEALTH CARE EDUCATION/TRAINING PROGRAM

## 2024-03-20 PROCEDURE — 3080F DIAST BP >= 90 MM HG: CPT | Performed by: STUDENT IN AN ORGANIZED HEALTH CARE EDUCATION/TRAINING PROGRAM

## 2024-03-20 RX ORDER — METFORMIN HYDROCHLORIDE 500 MG/1
500 TABLET, EXTENDED RELEASE ORAL
Qty: 30 TABLET | Refills: 11 | Status: SHIPPED | OUTPATIENT
Start: 2024-03-20 | End: 2025-03-20

## 2024-03-20 RX ORDER — HYDROCHLOROTHIAZIDE 25 MG/1
25 TABLET ORAL DAILY
Qty: 30 TABLET | Refills: 5 | Status: SHIPPED | OUTPATIENT
Start: 2024-03-20 | End: 2024-09-16

## 2024-03-20 ASSESSMENT — PAIN SCALES - GENERAL: PAINLEVEL: 0-NO PAIN

## 2024-03-20 ASSESSMENT — PATIENT HEALTH QUESTIONNAIRE - PHQ9
2. FEELING DOWN, DEPRESSED OR HOPELESS: NOT AT ALL
1. LITTLE INTEREST OR PLEASURE IN DOING THINGS: NOT AT ALL
SUM OF ALL RESPONSES TO PHQ9 QUESTIONS 1 & 2: 0

## 2024-03-20 ASSESSMENT — ENCOUNTER SYMPTOMS
LOSS OF SENSATION IN FEET: 0
OCCASIONAL FEELINGS OF UNSTEADINESS: 0
DEPRESSION: 0

## 2024-03-20 NOTE — PATIENT INSTRUCTIONS
Marco A Barajas Hayes    Below is list of items we discussed in clinic today     HTN - please start the water pill called hydrochlorothiazide. Once daily 25 mg.  Pre-diabetes - please restart metformin 500 mg daily.   Please complete the cardiac calcium score to better determine your risk of heart disease. This will also inform us on whether we should start a statin medication to reduce your risk of stroke and heart attack   Referral to your gynecologist     Please complete blood work before your next appointment in 3 months

## 2024-03-20 NOTE — PROGRESS NOTES
Reason for Visit: FUV  HPI: 39 YOF presenting to [facility] [place] [facility] as FUV. PMHx of chlamydia + trichamonas vaginalis, HTN, pre-diabetes (A1C 5.9% 2023), hyperTG (2023 400). Last visit was 2023 - patient endorsed abd pain with + positive chlamydia test with completion of antibiotics and resolution of symptoms. Since then had ED visit for palpitations in the setting of binge drinking during New Years Party. In the ED, unremarkable EKG (unclear whether EKG at the time of the event) who was discharged and followed up with cardiology - TTE ordered and negative for structural heart disease.     Today denies any additional palpitations. Reports that prior her last visit was drinking and smoking tobacco daily due to life stressors. Since our conversation and ED visit, only drinking 2 shots of heavy liquor on the weekends together with several cigarettes. Reports dramatic cut down in nicotine and alcohol use.     Pre-DM and HTN - currently not on any medications. She is not sure but was told to stop one of her medications (amlodipine? Metformin?) via Epic chat. Unable to show the message. Agreeable to re-starting metformin and prefers a water pill.     Currently not sexually active.     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 Lives with boyfriend. Smoked tobacco and drinks socially however stopped smoking recently because it made her feel unusually.     Family History: HTN, Breast Cancer - Maternal Grandmother (age of 70), Maternal Aunt (elderly however did not know specific age)    Allergies: PCN (does not know reaction)    Current Outpatient Medications on File Prior to Visit   Medication Sig Dispense Refill    blood pressure test kit (BLOOD PRESSURE MONITOR [facility]) 1 each once daily.      ibuprofen 600 mg tablet Take 1 tablet (600 mg) by mouth 4 times a day as needed  for mild pain (1 - 3) (pain). (Patient not taking: Reported on 1/3/2024) 90 tablet 5    metFORMIN XR (Glucophage-XR) 500 mg 24 hr tablet Take 1 tablet (500 mg) by mouth once daily in the evening. Take with meals. Do not crush, chew, or split. (Patient not taking: Reported on 1/3/2024) 30 tablet 11     No current facility-administered medications on file prior to visit.        Physical Exam   Const - appears well  CV - S1/S2, no mrg  GI - soft, normoactive BS, NT to palpation   Pulm - CTAB  Ext - WWP  MSK- negative straight leg raise, no pain on extension or flexion of lower extremities     A/P:     39 YOF with PMHx of chlamydia + trichamonas vaginalis, HTN, pre-diabetes (A1C 5.9% 11/2023), hyperTG (11/2023 400) presenting to [facility] as FUV.     #HTN, suspect essential   ::Contributing risk factors  - tobacco use, EToH intake, and weight (BMI ~30)   :: Poor compliance with hydrochlorothiazide and amlodipine   :: Reports that she prefers water pill   :: protein albumin/creatinine negative   -Re-started hydrochlorothiazide 25 mg qD  -Instructed patient on lifestyle modification    #Prediabetes (A1C 5.9% 11/2023)  ::Modifiable risk factors as stated above  -Instructed to remain compliant with Metformin   -Repeat A1C in 3 months prior to next visit     #HyperTG  :: 1200 (2021). 400 (2023) - latter was non-fasting   ::No hx of associated sequelae including acute pancreatitis, ASCVD  ::Suspect in the setting of pre-diabetes and alcohol use   ::No indication for EPA/IPE (per reduce-it)  ::ASCVD calculated to be ~4.4% on repeat lipid panel   -C/w pre-diabetes and LSM as above   -Defer CAC scoring     # Health Maintenance  Cancer Screening  - Colonoscopy: not indicated   - Mammogram: not indicated   - Pap + HPV: last checked 2018 (gyn referral resent today)  Cardiovascular Screening   - ASCVD risk score: ~4.4% based on 2023 lipid panel; repeat lipid panel prior to next visit   Infectious disease  - HIV/Syphilis/Hepatitis C:  negative 2022  Vaccines  - Influenza: Declined IO  - Tdap: Completed 11/2023  - Pneumonia: not indicated   - COVID:  not discussed     RTC 3 months

## 2024-03-25 NOTE — PROGRESS NOTES
I reviewed the resident/fellow's documentation and discussed the patient with the resident/fellow. I agree with the resident/fellow's medical decision making as documented in the note.     Baljinder Peralta MD MPH

## 2024-06-20 ENCOUNTER — APPOINTMENT (OUTPATIENT)
Dept: PRIMARY CARE | Facility: HOSPITAL | Age: 39
End: 2024-06-20
Payer: COMMERCIAL

## 2024-09-27 ENCOUNTER — APPOINTMENT (OUTPATIENT)
Dept: OBSTETRICS AND GYNECOLOGY | Facility: CLINIC | Age: 39
End: 2024-09-27
Payer: COMMERCIAL

## 2024-11-14 ENCOUNTER — APPOINTMENT (OUTPATIENT)
Dept: PRIMARY CARE | Facility: HOSPITAL | Age: 39
End: 2024-11-14
Payer: COMMERCIAL

## 2024-11-27 ENCOUNTER — HOSPITAL ENCOUNTER (EMERGENCY)
Facility: HOSPITAL | Age: 39
Discharge: HOME | End: 2024-11-27
Payer: COMMERCIAL

## 2024-11-27 ENCOUNTER — TELEPHONE (OUTPATIENT)
Dept: PRIMARY CARE | Facility: HOSPITAL | Age: 39
End: 2024-11-27
Payer: COMMERCIAL

## 2024-11-27 VITALS
DIASTOLIC BLOOD PRESSURE: 102 MMHG | RESPIRATION RATE: 16 BRPM | OXYGEN SATURATION: 100 % | BODY MASS INDEX: 29.44 KG/M2 | HEIGHT: 62 IN | WEIGHT: 160 LBS | TEMPERATURE: 96.1 F | SYSTOLIC BLOOD PRESSURE: 142 MMHG | HEART RATE: 80 BPM

## 2024-11-27 DIAGNOSIS — N64.4 BREAST PAIN, RIGHT: Primary | ICD-10-CM

## 2024-11-27 PROCEDURE — 99281 EMR DPT VST MAYX REQ PHY/QHP: CPT

## 2024-11-27 ASSESSMENT — PAIN DESCRIPTION - PAIN TYPE: TYPE: ACUTE PAIN

## 2024-11-27 ASSESSMENT — PAIN SCALES - GENERAL: PAINLEVEL_OUTOF10: 7

## 2024-11-27 ASSESSMENT — PAIN DESCRIPTION - LOCATION: LOCATION: BREAST

## 2024-11-27 ASSESSMENT — PAIN - FUNCTIONAL ASSESSMENT: PAIN_FUNCTIONAL_ASSESSMENT: 0-10

## 2024-11-27 NOTE — TELEPHONE ENCOUNTER
Called patient at 2:06 PM     Breast pain  -two lumps in the right breast - first noticed one week ago  -breast is swollen, started last night  -pain is sharp, started last night  -no fever, redness  -skin changes: no  -discharge: no  -recent injury: no obvious injury   -menstruation: LMP 10/28  -grandmother and aunts had breast cancer in 60s    Seems like this could be either a cyst, infection or trauma.  Low likelihood for malignancy but needs imaging to rule out.    Because of the holiday limiting clinic hours this week, I advised patient to go to the ED where they could do and exam and possibly an ultrasound if indicated based on exam.    Patient understands, she said she will head to [facility].    Tod Lozano MD

## 2024-11-27 NOTE — ED PROVIDER NOTES
Chief Complaint   Patient presents with    Breast Mass     HPI:   Casi Koo is an 39 y.o. female presenting to the ED for right breast pain.  She explains that she noticed it on the lateral aspect of her breast last night after work.  Describes the pain as sharp and intermittent in her right lateral breast.  She denies any drainage from the site.  No redness at the site.  No nipple discharge.  No fever chills assessment no nausea or vomiting.  No chest pain or shortness of breath.  No abdominal pain nausea vomiting.  Patient reports eating a lot of chocolate and drinking alcohol but denies caffeine use.  Last menstrual period was 10/28.  Does have a family history of breast cancer.      Allergies   Allergen Reactions    Bee Venom Protein (Honey Bee) Unknown    Other Unknown    Penicillin Unknown   :  Past Medical History:   Diagnosis Date    Acute vaginitis     Bacterial vaginosis    Chlamydial infection, unspecified     Chlamydia    Complete or unspecified spontaneous  without complication ([facility])         Irregular menstruation, unspecified     Irregular menses    Maternal care for unspecified type scar from previous  delivery ([facility])      (vaginal birth after )    Other conditions influencing health status     History of pregnancy    Personal history of diseases of the blood and blood-forming organs and certain disorders involving the immune mechanism     History of sickle cell trait    Personal history of other mental and behavioral disorders     History of depression     Past Surgical History:   Procedure Laterality Date     SECTION, CLASSIC  03/10/2014     Section    DILATION AND CURETTAGE OF UTERUS  03/10/2014    Dilation And Curettage     No family history on file.     Physical Exam  Vitals and nursing note reviewed.   Constitutional:       General: She is not in acute distress.     Appearance: She is well-developed.   HENT:      Head:  Normocephalic and atraumatic.      Right Ear: Tympanic membrane normal.      Left Ear: Tympanic membrane normal.      Nose: Nose normal.      Mouth/Throat:      Mouth: Mucous membranes are moist.      Pharynx: Oropharynx is clear.   Eyes:      Extraocular Movements: Extraocular movements intact.      Conjunctiva/sclera: Conjunctivae normal.      Pupils: Pupils are equal, round, and reactive to light.   Cardiovascular:      Rate and Rhythm: Normal rate and regular rhythm.      Heart sounds: No murmur heard.  Pulmonary:      Effort: Pulmonary effort is normal. No respiratory distress.      Breath sounds: Normal breath sounds.   Abdominal:      Palpations: Abdomen is soft.      Tenderness: There is no abdominal tenderness.   Musculoskeletal:         General: No swelling.      Cervical back: Neck supple.   Skin:     General: Skin is warm and dry.      Capillary Refill: Capillary refill takes less than 2 seconds.   Neurological:      General: No focal deficit present.      Mental Status: She is alert and oriented to person, place, and time.   Psychiatric:         Mood and Affect: Mood normal.        VS: As documented in the triage note and EMR flowsheet from this visit were reviewed.    External Records Reviewed: I reviewed recent and relevant outside records including: Reviewed telephone encounter from earlier today with patient's primary care provider.  She does have an appointment for follow-up scheduled on 12/18/2024      Medical Decision Making: This is a 39-year-old female presenting to the ED for concerns for mass in her right breast..  On exam she is afebrile in no acute distress.  Her pain is in the right upper outer quadrant of the right breast.  There is no significant erythema or swelling visible over the skin.  No dimpling or deformity.  No fluctuance or induration.  No nipple discharge. There is a deep tense mass that is freely mobile feels like dense breast tissue.  I do not think ultrasound is indicated  at this time.  I have low suspicion for infection or abscess.  Patient was given a referral to breast surgery and recommend mammogram.  Patient understands and is agreeable to the plan.  Recommended ice and ibuprofen at the area.  She is agreeable to the plan.  Diagnoses as of 11/27/24 1633   Breast pain, right     Counseling: Spoke with the patient and discussed today´s findings, in addition to providing specific details for the plan of care and expected course.  Patient was given the opportunity to ask questions.    Discussed return precautions and importance of follow-up.  Advised to follow-up with PCP.  I specifically advised to return to the ED for changing or worsening symptoms, new symptoms, complaint specific precautions, and precautions listed on the discharge paperwork.  Educated on the common potential side effects of medications prescribed.    I advised the patient that the emergency evaluation and treatment provided today doesn't end their need for medical care. It is very important that they follow-up with their primary care provider or other specialist as instructed.    The plan of care was mutually agreed upon with the patient. The patient and/or family were given the opportunity to ask questions. All questions asked today in the ED were answered to the best of my ability with today's information.    This report was transcribed using voice recognition software.  Every effort was made to ensure accuracy, however, inadvertently computerized transcription errors may be present.       Parmjit Gregg PA-C  11/27/24 4467

## 2024-11-27 NOTE — DISCHARGE INSTRUCTIONS
Your evaluated in the ED for breast pain.  It does not appear that you have any sort of infection.  Your mass is relatively deep and requires an outpatient mammogram.  Please follow-up with breast surgery to get this scheduled.  Please schedule follow-up with your PCP for further management.  Return to the ED for worsening symptoms.

## 2024-11-27 NOTE — ED TRIAGE NOTES
Pt c/o knot in R breast noted 1 month ago and again 1 week ago; pt sts noted change in size. Pt called PCP who recommended to be seen for an ultrasound.

## 2024-12-18 ENCOUNTER — DOCUMENTATION (OUTPATIENT)
Dept: PRIMARY CARE | Facility: HOSPITAL | Age: 39
End: 2024-12-18
Payer: COMMERCIAL

## 2024-12-18 ENCOUNTER — OFFICE VISIT (OUTPATIENT)
Dept: PRIMARY CARE | Facility: HOSPITAL | Age: 39
End: 2024-12-18
Payer: COMMERCIAL

## 2024-12-18 VITALS
OXYGEN SATURATION: 97 % | HEIGHT: 62 IN | SYSTOLIC BLOOD PRESSURE: 111 MMHG | TEMPERATURE: 95.5 F | WEIGHT: 157 LBS | BODY MASS INDEX: 28.89 KG/M2 | DIASTOLIC BLOOD PRESSURE: 75 MMHG | HEART RATE: 103 BPM

## 2024-12-18 DIAGNOSIS — Z72.51 UNPROTECTED SEX: ICD-10-CM

## 2024-12-18 DIAGNOSIS — K21.9 GASTROESOPHAGEAL REFLUX DISEASE, UNSPECIFIED WHETHER ESOPHAGITIS PRESENT: Primary | ICD-10-CM

## 2024-12-18 DIAGNOSIS — E78.1 HYPERTRIGLYCERIDEMIA: ICD-10-CM

## 2024-12-18 DIAGNOSIS — R73.03 PREDIABETES: ICD-10-CM

## 2024-12-18 DIAGNOSIS — E87.6 HYPOKALEMIA: ICD-10-CM

## 2024-12-18 DIAGNOSIS — I10 HYPERTENSION, UNSPECIFIED TYPE: ICD-10-CM

## 2024-12-18 LAB
ALBUMIN SERPL BCP-MCNC: 4.8 G/DL (ref 3.4–5)
ALP SERPL-CCNC: 66 U/L (ref 33–110)
ALT SERPL W P-5'-P-CCNC: 15 U/L (ref 7–45)
ANION GAP SERPL CALC-SCNC: 16 MMOL/L (ref 10–20)
AST SERPL W P-5'-P-CCNC: 18 U/L (ref 9–39)
BILIRUB SERPL-MCNC: 0.7 MG/DL (ref 0–1.2)
BUN SERPL-MCNC: 17 MG/DL (ref 6–23)
CALCIUM SERPL-MCNC: 9.7 MG/DL (ref 8.6–10.6)
CHLORIDE SERPL-SCNC: 99 MMOL/L (ref 98–107)
CHLORIDE UR-SCNC: 58 MMOL/L
CHLORIDE/CREATININE (MMOL/G) IN URINE: 63 MMOL/G CREAT (ref 38–318)
CHOLEST SERPL-MCNC: 240 MG/DL (ref 0–199)
CHOLESTEROL/HDL RATIO: 3.6
CO2 SERPL-SCNC: 27 MMOL/L (ref 21–32)
CREAT SERPL-MCNC: 0.64 MG/DL (ref 0.5–1.05)
CREAT UR-MCNC: 92.5 MG/DL (ref 20–320)
EGFRCR SERPLBLD CKD-EPI 2021: >90 ML/MIN/1.73M*2
EST. AVERAGE GLUCOSE BLD GHB EST-MCNC: 100 MG/DL
GLUCOSE SERPL-MCNC: 91 MG/DL (ref 74–99)
HBA1C MFR BLD: 5.1 %
HCG UR QL IA.RAPID: NEGATIVE
HDLC SERPL-MCNC: 66.3 MG/DL
LDLC SERPL CALC-MCNC: 111 MG/DL
MAGNESIUM SERPL-MCNC: 1.99 MG/DL (ref 1.6–2.4)
NON HDL CHOLESTEROL: 174 MG/DL (ref 0–149)
POTASSIUM SERPL-SCNC: 4.6 MMOL/L (ref 3.5–5.3)
POTASSIUM UR-SCNC: 19 MMOL/L
POTASSIUM/CREAT UR-RTO: 21 MMOL/G CREAT
PROT SERPL-MCNC: 8.1 G/DL (ref 6.4–8.2)
SODIUM SERPL-SCNC: 137 MMOL/L (ref 136–145)
SODIUM UR-SCNC: 77 MMOL/L
SODIUM/CREAT UR-RTO: 83 MMOL/G CREAT
TRIGL SERPL-MCNC: 312 MG/DL (ref 0–149)
VLDL: 62 MG/DL (ref 0–40)

## 2024-12-18 PROCEDURE — 80061 LIPID PANEL: CPT

## 2024-12-18 PROCEDURE — 82570 ASSAY OF URINE CREATININE: CPT

## 2024-12-18 PROCEDURE — 83036 HEMOGLOBIN GLYCOSYLATED A1C: CPT

## 2024-12-18 PROCEDURE — 36415 COLL VENOUS BLD VENIPUNCTURE: CPT

## 2024-12-18 PROCEDURE — 80053 COMPREHEN METABOLIC PANEL: CPT

## 2024-12-18 PROCEDURE — 84133 ASSAY OF URINE POTASSIUM: CPT

## 2024-12-18 PROCEDURE — 81025 URINE PREGNANCY TEST: CPT

## 2024-12-18 PROCEDURE — 83735 ASSAY OF MAGNESIUM: CPT

## 2024-12-18 RX ORDER — OMEPRAZOLE 40 MG/1
40 CAPSULE, DELAYED RELEASE ORAL 2 TIMES DAILY
Qty: 180 CAPSULE | Refills: 1 | Status: SHIPPED | OUTPATIENT
Start: 2024-12-18 | End: 2025-06-16

## 2024-12-18 ASSESSMENT — ENCOUNTER SYMPTOMS
DEPRESSION: 0
LOSS OF SENSATION IN FEET: 0
OCCASIONAL FEELINGS OF UNSTEADINESS: 0

## 2024-12-18 ASSESSMENT — PATIENT HEALTH QUESTIONNAIRE - PHQ9
SUM OF ALL RESPONSES TO PHQ9 QUESTIONS 1 AND 2: 0
1. LITTLE INTEREST OR PLEASURE IN DOING THINGS: NOT AT ALL
2. FEELING DOWN, DEPRESSED OR HOPELESS: NOT AT ALL

## 2024-12-18 ASSESSMENT — PAIN SCALES - GENERAL: PAINLEVEL_OUTOF10: 10-WORST PAIN EVER

## 2024-12-18 NOTE — PROGRESS NOTES
Advanced care planning discussed at this visit.  Patient does not currently have a Healthcare Power of  or Living Will in place. She does express that her friend Dora Naylor has her permission to act as her surrogate decision maker in the event of an emergency. Patient advised to bring in POA, Living Will and Advanced Care Plan for her chart if she obtains one.  Patient declined information and documentation on POA and Living Will at this time.

## 2024-12-18 NOTE — PROGRESS NOTES
[facility]    CC: Follow up  HPI:  Casi Koo is a 39 y.o. female with PMH including HTN, pre-diabetes (5.9% 11/2023), hypertriglyceridemia, Hx chlamydia and trichomonas vaginalis who presents to [facility] for follow up visit.      Interval History: Patient last seen in [facility] clinic 3/20/24. At that time she was counseled on medication adherence, and was re-started on hydrochlorothiazide 25 mg and metformin 500 mg once daily with plan to draw labs at next visit. She recently contacted the office on 11/27 for R breast pain and was advised to present to the ED for further evaluation. She did so and was provided a referral to breast surgery. Mammogram and ultrasounds were subsequently ordered (scheduled for 12/19) and she has an appointment to see breast surgery on 12/19.     Per the patient: Made the appointment because of her breast pain, went to the ED and has an appointment tomorrow. Having occasional sharp pain in her breast that comes and goes every couple of days. Pain started 2-3 weeks ago, while at work. When it happens lasts about an hour. No purulent, milky, or bloody discharge. Not currently having any pain, last occurred 2 days ago at work ([employer]). No trauma.     Also having bad reflux, has been going on for 4 years. Has to eat dinner early in the day or else she has bad reflux throughout the night. Taking Prilosec or Nexium, but not sure about how much. These help for a few days and she spaces them out because she only gets a few in the bottle when she buys them. Foods like red sauce, pizza, hot wings, juice makes it worse. Gets nauseous at these time and will vomit when it's bad. No bloody vomit. No issues with BM or urination.    Has been cutting back on snacks, eating less junk food and more granola.      ROS:   GENERAL.: Denies N/V/F/C.   ENT: Denies sore throat, runny nose.   CARDIO: Denies chest pain.   PULMONARY: Denies shortness of breath, cough.   GI: Denies  abdominal pain, constipation, diarrhea, bloody or black stools.   : Denies frequency, urgency, dysuria, hematuria.   MS: Denies limb pain, joint pain.   SKIN: Denies rashes.   PSYCH: Denies change in mood.   Review of systems is otherwise negative unless stated above or in history of present illness     Social:   Living situation: House, lives with 2 kids. Feels safe at home.  Work: [employer].  Alcohol: Drinks 1 pint of tequila a day. Trying to quit. Willing to try cutting back.  Tobacco: Quit smoking 4-5 months ago. Used to smoke ¼ ppd for 15 years.  Other drugs: Denies.  Sexually active?: Yes, with men, 1 partner, no concerns about infection. Not using contraception. Has 3 kids, last period was 11/20. Period is usually pretty regular.     Health maintenance:  Health Maintenance   Topic Date Due    Yearly Adult Physical  Never done    Varicella Vaccines (1 of 2 - 13+ 2-dose series) Never done    Hepatitis B Vaccines (1 of 3 - 19+ 3-dose series) Never done    Cervical Cancer Screening  09/25/2021    Influenza Vaccine (1) Never done    COVID-19 Vaccine (1 - 2024-25 season) Never done    Diabetes: Hemoglobin A1C  11/29/2024    Diabetes Screening  11/29/2026    Lipid Panel  11/29/2028    DTaP/Tdap/Td Vaccines (8 - Td or Tdap) 11/29/2033    Zoster Vaccines (1 of 2) 03/07/2036    HIB Vaccines  Completed    IPV Vaccines  Completed    MMR Vaccines  Completed    HIV Screening  Completed    Hepatitis C Screening  Completed    Hepatitis A Vaccines  Aged Out    Meningococcal Vaccine  Aged Out    Rotavirus Vaccines  Aged Out    HPV Vaccines  Aged Out    Pneumococcal Vaccine: Pediatrics and At-Risk Adult Patients  Aged Out    Chlamydia Screening  Discontinued       Medications:    Current Outpatient Medications:     blood pressure test kit (BLOOD PRESSURE MONITOR MISC), 1 each once daily., Disp: , Rfl:     omeprazole (PriLOSEC) 40 mg DR capsule, Take 1 capsule (40 mg) by mouth 2 times a day. Do not crush or chew., Disp: 180  capsule, Rfl: 1    Require med refills? No  Preferred pharmacy: [facility] [address]       Past medical history:  Past Medical History:   Diagnosis Date    Acute vaginitis     Bacterial vaginosis    Chlamydial infection, unspecified     Chlamydia    Complete or unspecified spontaneous  without complication         Irregular menstruation, unspecified     Irregular menses    Maternal care for unspecified type scar from previous  delivery ([facility])      (vaginal birth after )    Other conditions influencing health status     History of pregnancy    Personal history of diseases of the blood and blood-forming organs and certain disorders involving the immune mechanism     History of sickle cell trait    Personal history of other mental and behavioral disorders     History of depression       Allergies:  Allergies   Allergen Reactions    Bee Venom Protein (Honey Bee) Unknown    Other Unknown    Penicillin Unknown       Surgical history:  Past Surgical History:   Procedure Laterality Date     SECTION, CLASSIC  03/10/2014     Section    DILATION AND CURETTAGE OF UTERUS  03/10/2014    Dilation And Curettage       Family history:  No family history on file.    Social history:   reports that she has quit smoking. Her smoking use included cigarettes. She has never used smokeless tobacco. She reports current alcohol use. She reports that she does not use drugs.    Social History     Social History Narrative    Not on file       Vitals:  Vitals:    24 1358   BP: 111/75   Pulse: 103   Temp: 35.3 °C (95.5 °F)   SpO2: 97%       Physical exam:  Constitutional: Well-developed female in no acute distress.  HEENT: NC/AT, MMM, sclera anicteric  Respiratory: CTAB. No wheezes, rales, or rhonchi. Normal respiratory effort.  Cardiovascular: RRR. No murmurs, gallops, or rubs.  Abdominal: Soft, nondistended, nontender to palpation.  Neuro: AAOx3. CN II-XII grossly intact. Tongue midline, no  facial droop  MSK: No LE edema bilaterally. Moving extremities well  Skin: Warm, dry. No rashes or wounds.  Psych: Appropriate mood and affect.    Assessment and Plan:  Casi Koo is a 39 y.o. female with PMH including HTN, GERD, pre-diabetes (5.9% 11/2023), hypertriglyceridemia, Hx chlamydia and trichomonas vaginalis who [place] [facility] [place].      #GERD  -symptoms associated with acidic/spicy foods, having severe nighttime symptoms    Plan:  -start omeprazole 40 BID, will reassess symptoms at follow up visit and consider GI consult if needed    #Breast mass  -noted painful mass in R breast ~3 weeks ago, was seen in ED 11/27 and given referral to breast surgeon  -Has mammogram and ultrasound ordered already, scheduled for 12/19  -Appointment with breast surgery also scheduled for 12/19    Plan:  -encouraged patient to get these studies completed and attend appointment with breast surgery    #Hypokalemia  -K 2.9 as of 4/2024, had been started on HCTZ the month prior but unclear if she had been taking it.  -She reports she generally hasn't been taking HCTZ recently, though says she took it yesterday    Plan:  -sending CMP, magnesium, urine electrolytes  -Stop HCTZ, will plan to initiate alternate BP med if needed.    #Unprotected sex  -pt reports currently sexually active with 1 male partner, not using contraception  -LMP 11/20 per the patient, interested in pregnancy testing.  -Not currently interested in STI testing    Plan:  -urine HCG    #HTN  -BP in office 111/75  -On HCTZ, though only given 30 day supply as of 3/2024, endorses she hasn't really been taking it but did take once yesterday.  -Has lost 12 pounds since 3/2024 and quit smoking, which may be contributing to improved BP though may also be in setting of taking med yesterday     Plan:   -Stop HCTZ as above  -nurse visit in 2 weeks to check BP, if elevated will consider initiating nifedipine or other agent due to potential  for future pregnancy      #Prediabetes   -(A1C 5.9% 11/2023)   -Has lost 12 lbs since 3/2024, cutting back on snacks  -previously prescribed metformin but never took    Plan:  -repeat HBA1C      #HyperTG   -1200 (2021). 400 (2023) - latter was non-fasting    -No hx of associated sequelae including acute pancreatitis, ASCVD   -Suspect in the setting of pre-diabetes and alcohol use    -C/w pre-diabetes and LSM as above      Plan:  -repeat lipid panel     #Etoh use  -reports drinking 1 pint of tequila most days  -denies symptoms associated with not drinking including shakes, anxiety, seizures  -Agreeable to cut back, feels she could quit if needed but feels life is currently too difficult to fully quit  -Not interested in support/resources for quitting, feels she's able to do so on her own when ready    Plan:  -will follow up success at quitting/cutting back at future visit    #Hx tobacco use  -~4 pack year history, quit 7/2024  -encourage continued avoidance of tobacco    #Health Maintenance   #Immunizations: Received childhood series? Ever vaccinated outside [place]? Per [employer] received DTaP/DTP/Td x5 (last 9/1990), Tdap x1 (last 11/2023), OPV/IPV x4 (last 9/1990), MMR x2 (last 10/2001), Hib (9/1990), Due for HEPB, HEPA, Varicella, COVID-19 per [employer].      #Screening:   -Cardiovascular: Lipid panel 11/2023 with cholesterol 200, HDL 51.0, LDL (unable to calculate), triglycerides 405, [id number]   -Diabetes: HBA1C 5.9 (11/2023)   -Cancer: Breast (mammogram scheduled), Cervical (negative pap 2018, due), Colorectal (not due), Lung (not due)   -Infectious Disease: HIV negative 10/2022, HEPC negative 10/2022   -Osteoporosis: not indicated      Plan:   -lipid panel, HBA1C ordered  -Encouraged to get flu shot  -Will address other needs at future visit     RTC 2 weeks for nurse visit, 3 months for follow up     Pt staffed with attending Dr. Peralta

## 2024-12-18 NOTE — PATIENT INSTRUCTIONS
It was very nice to see you in the clinic today. These are the things we talked about today.   Please make sure you go to your appointments tomorrow for the mammogram an ultrasound to check on the lump in your breast.  For your reflux, we are starting a medication called omeprazole, this is a stronger version of the over the counter medicine you were taking. Since your symptoms are so severe please start taking this medicine twice daily. When we see you at the next appointment we will see if you need to talk to the GI doctors about it depending on how well controlled your symptoms are.  Please work on cutting back on your alcohol consumption. This will help keep you healthy long term.  We ordered labs for you. We will call you with the results of these.  Please get your annual flu shot at your pharmacy.  Please do not take the old hydrochlorothiazide or the metformin you were previously prescribed. Please return to the clinic in 2 weeks for a nurse visit to check your blood pressure. Depending on what your blood pressure is at that visit and depending on what your labs look like we may start you on a new blood pressure medication at that visit.  Please return to the clinic in 3 months to follow up on your reflux.     If you have any questions or need refills on your medications please call our office at [phone].

## 2024-12-20 ENCOUNTER — TELEPHONE (OUTPATIENT)
Dept: PRIMARY CARE | Facility: HOSPITAL | Age: 39
End: 2024-12-20
Payer: COMMERCIAL

## 2024-12-20 NOTE — TELEPHONE ENCOUNTER
HERMINIO spoke with patient stating that the doctor would need to order a BP cuff in order to receive one. HERMINIO will send message to doctor seeing if this is appropriate.

## 2024-12-23 NOTE — PROGRESS NOTES
I saw and evaluated the patient. I personally obtained the key and critical portions of the history and physical exam or was physically present for key and critical portions performed by the resident/fellow. I reviewed the resident/fellow's documentation and discussed the patient with the resident/fellow. I agree with the resident/fellow's medical decision making as documented in the note.    Baljinder Peralta MD MPH

## 2024-12-25 ENCOUNTER — HOSPITAL ENCOUNTER (EMERGENCY)
Facility: HOSPITAL | Age: 39
Discharge: HOME | End: 2024-12-25
Attending: EMERGENCY MEDICINE
Payer: COMMERCIAL

## 2024-12-25 VITALS
TEMPERATURE: 99.9 F | BODY MASS INDEX: 27.82 KG/M2 | HEART RATE: 93 BPM | DIASTOLIC BLOOD PRESSURE: 87 MMHG | WEIGHT: 157 LBS | RESPIRATION RATE: 18 BRPM | HEIGHT: 63 IN | OXYGEN SATURATION: 98 % | SYSTOLIC BLOOD PRESSURE: 137 MMHG

## 2024-12-25 DIAGNOSIS — R11.2 NAUSEA VOMITING AND DIARRHEA: ICD-10-CM

## 2024-12-25 DIAGNOSIS — U07.1 COVID-19: Primary | ICD-10-CM

## 2024-12-25 DIAGNOSIS — R19.7 NAUSEA VOMITING AND DIARRHEA: ICD-10-CM

## 2024-12-25 LAB
ALBUMIN SERPL BCP-MCNC: 4.1 G/DL (ref 3.4–5)
ALP SERPL-CCNC: 57 U/L (ref 33–110)
ALT SERPL W P-5'-P-CCNC: 26 U/L (ref 7–45)
ANION GAP SERPL CALC-SCNC: 14 MMOL/L (ref 10–20)
APPEARANCE UR: ABNORMAL
AST SERPL W P-5'-P-CCNC: 46 U/L (ref 9–39)
BACTERIA #/AREA URNS AUTO: ABNORMAL /HPF
BASOPHILS # BLD AUTO: 0.02 X10*3/UL (ref 0–0.1)
BASOPHILS NFR BLD AUTO: 0.4 %
BILIRUB SERPL-MCNC: 0.7 MG/DL (ref 0–1.2)
BILIRUB UR STRIP.AUTO-MCNC: NEGATIVE MG/DL
BUN SERPL-MCNC: 10 MG/DL (ref 6–23)
CALCIUM SERPL-MCNC: 8.5 MG/DL (ref 8.6–10.3)
CHLORIDE SERPL-SCNC: 102 MMOL/L (ref 98–107)
CO2 SERPL-SCNC: 25 MMOL/L (ref 21–32)
COLOR UR: ABNORMAL
CREAT SERPL-MCNC: 0.78 MG/DL (ref 0.5–1.05)
EGFRCR SERPLBLD CKD-EPI 2021: >90 ML/MIN/1.73M*2
EOSINOPHIL # BLD AUTO: 0.01 X10*3/UL (ref 0–0.7)
EOSINOPHIL NFR BLD AUTO: 0.2 %
ERYTHROCYTE [DISTWIDTH] IN BLOOD BY AUTOMATED COUNT: 13.3 % (ref 11.5–14.5)
FLUAV RNA RESP QL NAA+PROBE: NOT DETECTED
FLUBV RNA RESP QL NAA+PROBE: NOT DETECTED
GLUCOSE SERPL-MCNC: 86 MG/DL (ref 74–99)
GLUCOSE UR STRIP.AUTO-MCNC: NORMAL MG/DL
HCG UR QL IA.RAPID: NEGATIVE
HCT VFR BLD AUTO: 30.8 % (ref 36–46)
HGB BLD-MCNC: 10.9 G/DL (ref 12–16)
IMM GRANULOCYTES # BLD AUTO: 0.01 X10*3/UL (ref 0–0.7)
IMM GRANULOCYTES NFR BLD AUTO: 0.2 % (ref 0–0.9)
KETONES UR STRIP.AUTO-MCNC: NEGATIVE MG/DL
LEUKOCYTE ESTERASE UR QL STRIP.AUTO: ABNORMAL
LIPASE SERPL-CCNC: 33 U/L (ref 9–82)
LYMPHOCYTES # BLD AUTO: 0.46 X10*3/UL (ref 1.2–4.8)
LYMPHOCYTES NFR BLD AUTO: 8.3 %
MCH RBC QN AUTO: 29.9 PG (ref 26–34)
MCHC RBC AUTO-ENTMCNC: 35.4 G/DL (ref 32–36)
MCV RBC AUTO: 84 FL (ref 80–100)
MONOCYTES # BLD AUTO: 0.74 X10*3/UL (ref 0.1–1)
MONOCYTES NFR BLD AUTO: 13.3 %
MUCOUS THREADS #/AREA URNS AUTO: ABNORMAL /LPF
NEUTROPHILS # BLD AUTO: 4.33 X10*3/UL (ref 1.2–7.7)
NEUTROPHILS NFR BLD AUTO: 77.6 %
NITRITE UR QL STRIP.AUTO: NEGATIVE
NRBC BLD-RTO: 0 /100 WBCS (ref 0–0)
PH UR STRIP.AUTO: 6.5 [PH]
PLATELET # BLD AUTO: 210 X10*3/UL (ref 150–450)
POTASSIUM SERPL-SCNC: 3.6 MMOL/L (ref 3.5–5.3)
PROT SERPL-MCNC: 7.6 G/DL (ref 6.4–8.2)
PROT UR STRIP.AUTO-MCNC: NEGATIVE MG/DL
RBC # BLD AUTO: 3.65 X10*6/UL (ref 4–5.2)
RBC # UR STRIP.AUTO: NEGATIVE /UL
RBC #/AREA URNS AUTO: ABNORMAL /HPF
RSV RNA RESP QL NAA+PROBE: NOT DETECTED
S PYO DNA THROAT QL NAA+PROBE: NOT DETECTED
SARS-COV-2 RNA RESP QL NAA+PROBE: DETECTED
SODIUM SERPL-SCNC: 137 MMOL/L (ref 136–145)
SP GR UR STRIP.AUTO: 1.02
SQUAMOUS #/AREA URNS AUTO: ABNORMAL /HPF
UROBILINOGEN UR STRIP.AUTO-MCNC: NORMAL MG/DL
WBC # BLD AUTO: 5.6 X10*3/UL (ref 4.4–11.3)
WBC #/AREA URNS AUTO: ABNORMAL /HPF
YEAST BUDDING #/AREA UR COMP ASSIST: PRESENT /HPF

## 2024-12-25 PROCEDURE — 2500000004 HC RX 250 GENERAL PHARMACY W/ HCPCS (ALT 636 FOR OP/ED): Performed by: EMERGENCY MEDICINE

## 2024-12-25 PROCEDURE — 99284 EMERGENCY DEPT VISIT MOD MDM: CPT | Mod: 25 | Performed by: EMERGENCY MEDICINE

## 2024-12-25 PROCEDURE — 96375 TX/PRO/DX INJ NEW DRUG ADDON: CPT | Performed by: EMERGENCY MEDICINE

## 2024-12-25 PROCEDURE — 87651 STREP A DNA AMP PROBE: CPT | Performed by: EMERGENCY MEDICINE

## 2024-12-25 PROCEDURE — 83690 ASSAY OF LIPASE: CPT | Performed by: EMERGENCY MEDICINE

## 2024-12-25 PROCEDURE — 96374 THER/PROPH/DIAG INJ IV PUSH: CPT | Performed by: EMERGENCY MEDICINE

## 2024-12-25 PROCEDURE — 36415 COLL VENOUS BLD VENIPUNCTURE: CPT | Performed by: EMERGENCY MEDICINE

## 2024-12-25 PROCEDURE — 81001 URINALYSIS AUTO W/SCOPE: CPT | Performed by: EMERGENCY MEDICINE

## 2024-12-25 PROCEDURE — 87637 SARSCOV2&INF A&B&RSV AMP PRB: CPT | Performed by: EMERGENCY MEDICINE

## 2024-12-25 PROCEDURE — 96361 HYDRATE IV INFUSION ADD-ON: CPT | Performed by: EMERGENCY MEDICINE

## 2024-12-25 PROCEDURE — 85025 COMPLETE CBC W/AUTO DIFF WBC: CPT | Performed by: EMERGENCY MEDICINE

## 2024-12-25 PROCEDURE — 81025 URINE PREGNANCY TEST: CPT | Performed by: EMERGENCY MEDICINE

## 2024-12-25 PROCEDURE — 93005 ELECTROCARDIOGRAM TRACING: CPT

## 2024-12-25 PROCEDURE — 2500000001 HC RX 250 WO HCPCS SELF ADMINISTERED DRUGS (ALT 637 FOR MEDICARE OP): Performed by: EMERGENCY MEDICINE

## 2024-12-25 PROCEDURE — 80053 COMPREHEN METABOLIC PANEL: CPT | Performed by: EMERGENCY MEDICINE

## 2024-12-25 RX ORDER — ONDANSETRON 4 MG/1
4 TABLET, ORALLY DISINTEGRATING ORAL EVERY 8 HOURS PRN
Qty: 20 TABLET | Refills: 0 | Status: SHIPPED | OUTPATIENT
Start: 2024-12-25 | End: 2025-01-01

## 2024-12-25 RX ORDER — ACETAMINOPHEN 325 MG/1
975 TABLET ORAL ONCE
Status: COMPLETED | OUTPATIENT
Start: 2024-12-25 | End: 2024-12-25

## 2024-12-25 RX ORDER — ONDANSETRON HYDROCHLORIDE 2 MG/ML
4 INJECTION, SOLUTION INTRAVENOUS ONCE
Status: COMPLETED | OUTPATIENT
Start: 2024-12-25 | End: 2024-12-25

## 2024-12-25 RX ORDER — KETOROLAC TROMETHAMINE 30 MG/ML
15 INJECTION, SOLUTION INTRAMUSCULAR; INTRAVENOUS ONCE
Status: COMPLETED | OUTPATIENT
Start: 2024-12-25 | End: 2024-12-25

## 2024-12-25 ASSESSMENT — PAIN SCALES - GENERAL
PAINLEVEL_OUTOF10: 0 - NO PAIN
PAINLEVEL_OUTOF10: 10 - WORST POSSIBLE PAIN

## 2024-12-25 ASSESSMENT — PAIN DESCRIPTION - ONSET: ONSET: GRADUAL

## 2024-12-25 ASSESSMENT — PAIN - FUNCTIONAL ASSESSMENT: PAIN_FUNCTIONAL_ASSESSMENT: 0-10

## 2024-12-25 ASSESSMENT — PAIN DESCRIPTION - DESCRIPTORS: DESCRIPTORS: ACHING

## 2024-12-25 ASSESSMENT — PAIN DESCRIPTION - PROGRESSION: CLINICAL_PROGRESSION: NOT CHANGED

## 2024-12-25 ASSESSMENT — PAIN DESCRIPTION - FREQUENCY: FREQUENCY: CONSTANT/CONTINUOUS

## 2024-12-25 ASSESSMENT — PAIN DESCRIPTION - PAIN TYPE: TYPE: ACUTE PAIN

## 2024-12-25 ASSESSMENT — PAIN DESCRIPTION - LOCATION: LOCATION: GENERALIZED

## 2024-12-25 NOTE — ED PROVIDER NOTES
Chief Complaint   Patient presents with    Flu Symptoms     History of Present Illness   Casi Koo   MRN [id number]    39-year-old presents for evaluation of flulike symptoms.  Patient reports she felt well when she went to bed last night.  Woke this morning with multiple symptoms including body aches, nausea, vomiting, nonbloody diarrhea, chills without measured fever, gradual onset generalized headache, sore throat.  No chest pain, shortness of breath, abdominal pain or back pain.    Reviewed internal medicine office note from 12/18/2024.  Chronic medical conditions including hypertension, prediabetes, hypertriglyceridemia, GERD.  Prescribed hydrochlorothiazide and metformin for hypertension and omeprazole for GERD.  Reported drinking 1 pint of tequila most days.    Physical Exam   T 37.7 °C (99.9 °F)  HR (!) 109  BP (!) 141/91  RR 18  O2 99 % None (Room air)    General: Appears uncomfortable but nontoxic.  Head: Atraumatic.  Eyes: No conjunctival injection.   Ears Nose Throat: No epistaxis. Oral mucosa moist.  Oropharyngeal erythema without peritonsillar enlargement or uvular deviation.  Neck: Supple.  Cardiovascular: Extremities warm.  Regular rhythm.  No murmur.  No JVD.  Pulmonary: No stridor. Normal respiratory effort.  Lungs clear.  No conversational dyspnea on room air.  Abdominal: No distention.  Soft and nontender.  Musculoskeletal: No deformity or joint swelling.  Skin: No rash.  Psychiatric: Does not appear to respond to internal stimuli.  Neurologic: Alert. Follows directions. Face symmetric. No aphasia or dysarthria. Symmetric movement of upper and lower extremities.  Normal gait.    ED Course & Medical Decision Making   Triage vital signs notable for blood pressure 141/91 and heart rate 109 with temperature 37.7.  Patient appeared uncomfortable but nontoxic-appearing.  Suspect most likely viral syndrome.  Beta-hCG negative.  Intravenous fluids, Zofran, Tylenol, Toradol ordered to  address symptoms.    Labs demonstrated no leukocytosis, mild anemia hemoglobin 10.9, no leukocytosis.  Normal renal function and electrolytes.  Urinalysis demonstrated 75 leukocyte esterase, 2+ bacteria, 6-10 WBC with squamous epithelial cells and budding yeast.  No urinary symptoms so low suspicion for drug infection.  PCR swab was positive for COVID-19 which I suspect is most likely explanation for patient's symptoms.    At time of shift change working up pending vital signs after completion of fluids and PO challenge.     ED Course as of 12/25/24 2014   Wed Dec 25, 2024   1852 ECG 12 lead  Interpreted by me.  12/25/2024 at 1853.  Sinus rhythm 100 bpm.  , QRS 74, QTc 482.  No ST elevation. [MC]      ED Course User Index  [MC] Sascha Alfaro MD         Diagnoses as of 12/25/24 2014   COVID-19   Nausea vomiting and diarrhea            Sascha Alfaro MD  12/25/24 2014

## 2024-12-26 ENCOUNTER — APPOINTMENT (OUTPATIENT)
Dept: CARDIOLOGY | Facility: HOSPITAL | Age: 39
End: 2024-12-26
Payer: COMMERCIAL

## 2024-12-26 NOTE — DISCHARGE INSTRUCTIONS
You tested positive for COVID. Please use a mask at when you are in public, and stay home if able until 5 days after your symptoms started. Please use zofran for nausea. Please return to the ER if you have worse trouble breathing, chest pain, or have any new or concerning symptoms.

## 2024-12-28 LAB
ATRIAL RATE: 100 BPM
P AXIS: -14 DEGREES
P OFFSET: 217 MS
P ONSET: 168 MS
PR INTERVAL: 116 MS
Q ONSET: 226 MS
QRS COUNT: 17 BEATS
QRS DURATION: 74 MS
QT INTERVAL: 374 MS
QTC CALCULATION(BAZETT): 482 MS
QTC FREDERICIA: 443 MS
R AXIS: -7 DEGREES
T AXIS: -19 DEGREES
T OFFSET: 413 MS
VENTRICULAR RATE: 100 BPM

## 2025-01-09 ENCOUNTER — APPOINTMENT (OUTPATIENT)
Dept: RADIOLOGY | Facility: CLINIC | Age: 40
End: 2025-01-09
Payer: COMMERCIAL

## 2025-01-31 DIAGNOSIS — I10 HYPERTENSION, UNSPECIFIED TYPE: Primary | ICD-10-CM

## 2025-01-31 RX ORDER — ACETAMINOPHEN 500 MG
1 TABLET ORAL DAILY
Qty: 1 KIT | Refills: 0 | Status: SHIPPED | OUTPATIENT
Start: 2025-01-31

## 2025-03-12 ENCOUNTER — APPOINTMENT (OUTPATIENT)
Dept: PRIMARY CARE | Facility: HOSPITAL | Age: 40
End: 2025-03-12
Payer: COMMERCIAL

## 2025-04-30 ENCOUNTER — APPOINTMENT (OUTPATIENT)
Dept: OBSTETRICS AND GYNECOLOGY | Facility: CLINIC | Age: 40
End: 2025-04-30
Payer: COMMERCIAL

## 2025-05-13 ENCOUNTER — OFFICE VISIT (OUTPATIENT)
Dept: PRIMARY CARE | Facility: HOSPITAL | Age: 40
End: 2025-05-13
Payer: COMMERCIAL

## 2025-05-13 VITALS
OXYGEN SATURATION: 99 % | HEIGHT: 63 IN | WEIGHT: 154.76 LBS | SYSTOLIC BLOOD PRESSURE: 139 MMHG | BODY MASS INDEX: 27.42 KG/M2 | HEART RATE: 113 BPM | TEMPERATURE: 97.7 F | DIASTOLIC BLOOD PRESSURE: 95 MMHG

## 2025-05-13 DIAGNOSIS — Z12.4 SCREENING FOR CERVICAL CANCER: ICD-10-CM

## 2025-05-13 DIAGNOSIS — I10 HYPERTENSION, UNSPECIFIED TYPE: Primary | ICD-10-CM

## 2025-05-13 DIAGNOSIS — E78.5 DYSLIPIDEMIA: ICD-10-CM

## 2025-05-13 DIAGNOSIS — R73.03 PREDIABETES: ICD-10-CM

## 2025-05-13 PROCEDURE — 1036F TOBACCO NON-USER: CPT

## 2025-05-13 PROCEDURE — 3008F BODY MASS INDEX DOCD: CPT

## 2025-05-13 PROCEDURE — 99214 OFFICE O/P EST MOD 30 MIN: CPT

## 2025-05-13 PROCEDURE — 99214 OFFICE O/P EST MOD 30 MIN: CPT | Mod: GC

## 2025-05-13 PROCEDURE — 3075F SYST BP GE 130 - 139MM HG: CPT

## 2025-05-13 PROCEDURE — 3080F DIAST BP >= 90 MM HG: CPT

## 2025-05-13 RX ORDER — HYDROCHLOROTHIAZIDE 25 MG/1
25 TABLET ORAL DAILY
Qty: 30 TABLET | Refills: 5 | Status: SHIPPED | OUTPATIENT
Start: 2025-05-13 | End: 2025-11-09

## 2025-05-13 ASSESSMENT — ENCOUNTER SYMPTOMS
HYPERTENSION: 1
HEADACHES: 0
PND: 0
NECK PAIN: 0
ALLERGIC/IMMUNOLOGIC NEGATIVE: 1
BLURRED VISION: 0
OCCASIONAL FEELINGS OF UNSTEADINESS: 0
LOSS OF SENSATION IN FEET: 0
SHORTNESS OF BREATH: 0
PALPITATIONS: 0
SWEATS: 0
EYES NEGATIVE: 1
GASTROINTESTINAL NEGATIVE: 1
CONSTITUTIONAL NEGATIVE: 1
ORTHOPNEA: 0
DEPRESSION: 0

## 2025-05-13 ASSESSMENT — PAIN SCALES - GENERAL: PAINLEVEL_OUTOF10: 0-NO PAIN

## 2025-05-13 ASSESSMENT — PATIENT HEALTH QUESTIONNAIRE - PHQ9
1. LITTLE INTEREST OR PLEASURE IN DOING THINGS: NOT AT ALL
SUM OF ALL RESPONSES TO PHQ9 QUESTIONS 1 AND 2: 0
2. FEELING DOWN, DEPRESSED OR HOPELESS: NOT AT ALL

## 2025-05-13 NOTE — PROGRESS NOTES
Chief complaint: Follow up     HPI:  Casi Koo is a 40 y.o. female with PMH including HTN, pre-diabetes (5.9% 11/2023), hypertriglyceridemia, GERD, Hx chlamydia and trichomonas vaginalis (treated) who presents to [facility] clinic for follow up visit.      Interval History: Previously seen in [facility] December 2024, at that time BP was found below 120s and hydrochlorothiazide was discontinued. Patient followed up with outside provider who resumed it due to elevated BP. Currently taking only Hydrochlorothiazide 25 mg daily. Visited ED on 4/16/2025 due to abdominal pain and bright red blood in stool, found to be secondary to hemorrhoid, CT scan A/P did not find any intraabdominal pathology, and on other tests trichomonas vaginalis was found and treated. Denies further rectal bleeding.      Per the patient: Patient is feeling well, being compliant with antihypertensive, denies chest pain, palpitations or dyspnea. She has cut several food with high sodium concentration (chips), but still consume a lot of carbohydrates and wanted to supplement with creatine to gain weight. She reports that at home takes blood pressure every other day and sometimes get readings around 160s, asymptomatic. She did not bring any written report.     She wants to refill hydrochlorothiazide and continue with omeprazol for GERD. She reports persistence of heartburn, especially with certain spicy foods. She is taking currently one omeprazol per day.     Previous breast pain referred on past follow up has self resolved. Did not get mammogram or ultrasound and is not interested on getting them.     Medications:  Current Outpatient Medications   Medication Instructions    blood pressure monitor kit 1 kit, miscellaneous, Daily    omeprazole (PRILOSEC) 40 mg, oral, 2 times daily, Do not crush or chew.       Allergies:  RX Allergies[1]    Past medical history:  Medical History[2]    Surgical history:  Surgical History[3]    Family history:  Family  History[4]    Social history:   reports that she has quit smoking. Her smoking use included cigarettes. She has never used smokeless tobacco. She reports current alcohol use. She reports that she does not use drugs.    Health maintenance:  Health Maintenance   Topic Date Due    Yearly Adult Physical  Never done    Varicella Vaccines (1 of 2 - 13+ 2-dose series) Never done    Hepatitis B Vaccines (1 of 3 - 19+ 3-dose series) Never done    Cervical Cancer Screening  09/25/2021    COVID-19 Vaccine (1 - 2024-25 season) Never done    Influenza Vaccine (Season Ended) 09/01/2025    Diabetes: Hemoglobin A1C  12/18/2025    Diabetes Screening  12/18/2027    Lipid Panel  12/18/2029    DTaP/Tdap/Td Vaccines (8 - Td or Tdap) 11/29/2033    Zoster Vaccines (1 of 2) 03/07/2036    HIB Vaccines  Completed    IPV Vaccines  Completed    MMR Vaccines  Completed    HIV Screening  Completed    Hepatitis C Screening  Completed    Hepatitis A Vaccines  Aged Out    Meningococcal Vaccine  Aged Out    Rotavirus Vaccines  Aged Out    HPV Vaccines  Aged Out    Pneumococcal Vaccine: Pediatrics and At-Risk Adult Patients  Aged Out    Chlamydia Screening  Discontinued     Social:   Living situation: House, lives with 2 kids. Feels safe at home.  Work: [employer].  Alcohol: Drinks 1 drink every 2 weeks. Willing to try cutting back.  Tobacco: Quit smoking 4-5 months ago. Used to smoke ¼ ppd for 15 years.  Other drugs: Denies.  Sexually active?: Yes, with men, 1 partner, no concerns about infection. Not using contraception. Has 3 kids, last period was 11/20. Period is usually pretty regular.     Review of systems:  Review of Systems   Constitutional: Negative.    HENT: Negative.     Eyes: Negative.    Respiratory:  Negative for shortness of breath.    Cardiovascular:  Negative for chest pain and palpitations.   Gastrointestinal: Negative.    Musculoskeletal:  Negative for neck pain.   Allergic/Immunologic: Negative.    Neurological:  Negative for  headaches.        Vitals:  Vitals:    05/13/25 1318   BP: (!) 139/95   Pulse: (!) 113   Temp: 36.5 °C (97.7 °F)   SpO2: 99%       Physical exam:  Physical Exam  HENT:      Head: Normocephalic.      Nose: Nose normal.   Eyes:      Pupils: Pupils are equal, round, and reactive to light.   Cardiovascular:      Rate and Rhythm: Normal rate and regular rhythm.      Pulses: Normal pulses.      Heart sounds: Normal heart sounds.   Pulmonary:      Effort: Pulmonary effort is normal.      Breath sounds: Normal breath sounds.   Abdominal:      General: There is no distension.      Tenderness: There is no abdominal tenderness.   Musculoskeletal:         General: No swelling. Normal range of motion.      Cervical back: Normal range of motion.   Skin:     General: Skin is warm.      Capillary Refill: Capillary refill takes less than 2 seconds.   Neurological:      General: No focal deficit present.      Mental Status: She is alert.   Psychiatric:         Mood and Affect: Mood normal.         Labs:  Lab Results   Component Value Date    WBC 5.6 12/25/2024    HGB 10.9 (L) 12/25/2024    HCT 30.8 (L) 12/25/2024    MCV 84 12/25/2024     12/25/2024       Lab Results   Component Value Date    GLUCOSE 86 12/25/2024    CALCIUM 8.5 (L) 12/25/2024     12/25/2024    K 3.6 12/25/2024    CO2 25 12/25/2024     12/25/2024    BUN 10 12/25/2024    CREATININE 0.78 12/25/2024       Lab Results   Component Value Date    HGBA1C 5.1 12/18/2024        Lab Results   Component Value Date    CHOL 240 (H) 12/18/2024    CHOL 200 (H) 11/29/2023    CHOL 253 (H) 05/13/2021     Lab Results   Component Value Date    HDL 66.3 12/18/2024    HDL 51.0 11/29/2023    HDL 38.6 (A) 05/13/2021     Lab Results   Component Value Date    LDLCALC 111 (H) 12/18/2024    LDLCALC  11/29/2023      Comment:      The calculation of LDL and VLDL are inaccurate when the Triglycerides are greater than 400 mg/dL or when the patient is non-fasting. If LDL measurement  "is necessary contact the testing laboratory for an alternative LDL assay.                                  Near   Borderline      AGE      Desirable  Optimal    High     High     Very High     0-19 Y     0 - 109     ---    110-129   >/= 130     ----    20-24 Y     0 - 119     ---    120-159   >/= 160     ----      >24 Y     0 -  99   100-129  130-159   160-189     >/=190       Lab Results   Component Value Date    TRIG 312 (H) 12/18/2024    TRIG 405 (H) 11/29/2023    TRIG 1,279 (H) 05/13/2021     No components found for: \"CHOLHDL\"    Imaging:  Imaging  No results found.    Cardiology, Vascular, and Other Imaging  No other imaging results found for the past 7 days     Assessment and Plan:  Casi Koo is a 40 y.o. female with PMH including HTN, pre-diabetes (5.9% 11/2023), hypertriglyceridemia, GERD, Hx chlamydia and trichomonas vaginalis (treated) who presents to [facility] clinic for follow up visit.     Patient with blood pressure today 139/95, and reporting some readings at home around 160s. She did not bring log book today. We recommend taking blood pressure at least once a day, ideally twice at home during 2 weeks and bring to office for nurse checking. Nurse to follow up in 2 to 3 weeks, check self BP report and to message this provider with results to adjust antihypertensive therapy if needed. Update labs today.     #HTN  :: Patient had hypokalemia in the past, will check electrolytes   :: Report some BP readings around 160s at home  -c/w hydrochlorothiazide 25 mg daily   -congratulated her due to quit smoking   -Self check BP one to twice a day at home and write on note book  -visit with nurse in 2 weeks to check log book of BP and check BP. If persistence elevated >140/90, please message this provider to adjust therapy as needed  -Avoid high sodium food, and encourage physical activity     #GERD  :: Symptoms better, but still present, taking omeprazol 40 mg daily   -c/w omeprazol 40 mg day     "   #Prediabetes   :: HbA1c 5.1% 2024   :: Previously on metformin  - c/w life style changes, encourage to control carbohydrate intake and avoid processed food as much as possible   - No pharmacological treatment for now  - Repeat HbA1c       #Dyslipidemia   :: Hypertriglyceridemia TG 1200 (), 400 (), 312 (2024)  :: No history of pancreatitis   :: ASCVD 1.6% based on labs 2024  :: No pharmacological treatment at the moment   - Check lipid panel  - c/w prediabetes management and lifestyle modifications     #Health Maintenance   - Immunizations: Received childhood series? Ever vaccinated outside [place]? Per [employer] received DTaP/DTP/Td x5 (last 1990), Tdap x1 (last 2023), OPV/IPV x4 (last 1990), MMR x2 (last 10/2001), Hib (1990), Due for HEPB, HEPA, Varicella, COVID-19 per [employer].      - Screening:   -Cancer: Breast (mammogram scheduled), Cervical (negative pap , due-referral for gynecology), Colorectal (not due), Lung (not due)   -Infectious Disease: HIV negative 10/2022, HEPC negative 10/2022   -Osteoporosis: not indicated     Follow up in 2 weeks with nurse to check blood pressure   Follow-up in 6 months at [facility] clinic, sooner if any changes on therapy.    Patient and plan discussed with attending physician Dr. Burris.    Silviano Bustos MD   PGY1 Internal Medicine       [1]   Allergies  Allergen Reactions    Bee Venom Protein (Honey Bee) Unknown    Other Unknown    Penicillin Unknown   [2]   Past Medical History:  Diagnosis Date    Acute vaginitis     Bacterial vaginosis    Chlamydial infection, unspecified     Chlamydia    Complete or unspecified spontaneous  without complication         Irregular menstruation, unspecified     Irregular menses    Maternal care for unspecified type scar from previous  delivery ([facility])      (vaginal birth after )    Other conditions influencing health status     History of pregnancy    Personal history of  diseases of the blood and blood-forming organs and certain disorders involving the immune mechanism     History of sickle cell trait    Personal history of other mental and behavioral disorders     History of depression   [3]   Past Surgical History:  Procedure Laterality Date     SECTION, CLASSIC  03/10/2014     Section    DILATION AND CURETTAGE OF UTERUS  03/10/2014    Dilation And Curettage   [4] No family history on file.

## 2025-05-13 NOTE — PATIENT INSTRUCTIONS
As discussed today, our plan is:     Labs - we collected labs today and will call you with any abnormal results. If you have any questions or concerns prior to us calling feel free to call our office to have your questions addressed.   Medication changes: No changes   Continue Hydrochlorothiazide 25 mg daily and Omeprazol 40 mg once a day (you still have one refill)   Consultations - you were referred to see the following specialists: Gynecology to get update your pap smear for cancer screening. You should receive a call from central scheduling in the next few days if you do not receive a call within 3-5 business days please call [phone] to schedule your appointment.   4.   If you smoke or use other tobacco products, take steps to quit. Call [phone] for more information or to set up an appointment with  [facility]. The [place] Tobacco Quit Line is a free resource for people who don’t have insurance, receive Medicaid, pregnant women, or members of the [place] [facility]. Call 6-301-QUIT-NOW or 1-465.792.7373.  5. Please come in 2 weeks to meet with nurse, bring written down your blood pressure readings obtained twice a day (please see hand out for proper blood pressure measure at home). If Blood pressure elevated consistently in your report and nurse reading (above 140/90) we will consider to add another blood pressure medication. Nurse will let me know about results.     Please come back to see us in: 2 weeks for blood pressure check with nurse and 6 months with doctor.  ------  If you have any problems or questions, please contact the clinic at [phone] to leave a message. Our fax number is [phone]. If your issue cannot wait until the next business day, please go to urgent care or the emergency department.     I also strongly urge all of my patients to register for [employer]hart by going to: [email or web address]  (The  staff can also send  you a text/email link to register when you check out).    No shows: It is understandable if you are unable to make it to a visit, but please cancel your appointment instead of not showing up. This helps to give other patients access to primary care and keeps wait times low.      [facility]   [phone]

## 2025-05-14 LAB
ALBUMIN SERPL-MCNC: 4.9 G/DL (ref 3.6–5.1)
ALP SERPL-CCNC: 48 U/L (ref 31–125)
ALT SERPL-CCNC: 18 U/L (ref 6–29)
ANION GAP SERPL CALCULATED.4IONS-SCNC: 13 MMOL/L (CALC) (ref 7–17)
AST SERPL-CCNC: 18 U/L (ref 10–30)
BILIRUB SERPL-MCNC: 1 MG/DL (ref 0.2–1.2)
BUN SERPL-MCNC: 9 MG/DL (ref 7–25)
CALCIUM SERPL-MCNC: 9.8 MG/DL (ref 8.6–10.2)
CHLORIDE SERPL-SCNC: 98 MMOL/L (ref 98–110)
CHOLEST SERPL-MCNC: 262 MG/DL
CHOLEST/HDLC SERPL: 3.2 (CALC)
CO2 SERPL-SCNC: 26 MMOL/L (ref 20–32)
CREAT SERPL-MCNC: 0.76 MG/DL (ref 0.5–0.97)
EGFRCR SERPLBLD CKD-EPI 2021: 102 ML/MIN/1.73M2
EST. AVERAGE GLUCOSE BLD GHB EST-MCNC: 108 MG/DL
EST. AVERAGE GLUCOSE BLD GHB EST-SCNC: 6 MMOL/L
GLUCOSE SERPL-MCNC: 106 MG/DL (ref 65–99)
HBA1C MFR BLD: 5.4 %
HDLC SERPL-MCNC: 82 MG/DL
LDLC SERPL CALC-MCNC: 124 MG/DL (CALC)
MAGNESIUM SERPL-MCNC: 1.6 MG/DL (ref 1.5–2.5)
NONHDLC SERPL-MCNC: 180 MG/DL (CALC)
POTASSIUM SERPL-SCNC: 3.9 MMOL/L (ref 3.5–5.3)
PROT SERPL-MCNC: 8 G/DL (ref 6.1–8.1)
SODIUM SERPL-SCNC: 137 MMOL/L (ref 135–146)
TRIGL SERPL-MCNC: 393 MG/DL

## 2025-05-16 ENCOUNTER — TELEPHONE (OUTPATIENT)
Dept: PRIMARY CARE | Facility: HOSPITAL | Age: 40
End: 2025-05-16
Payer: COMMERCIAL

## 2025-05-16 NOTE — TELEPHONE ENCOUNTER
I have called the patient and she agreed with the following plan.     Triglycerides elevated, recommended changes in lifestyle and diet, patient reported high carbohydrate diet. Handout was given, and discussed about changes. ASCVD 2%, HTN and former smoker, since <40 years, no recommendation for statin is warranted right now, but will re check in next visit lipid panel, to re assess risk and follow up on TG.   Potassium within normal limits, can continue hydrochlorothiazide for now and record BP at home and visit nurse to see if further adjustment is needed. Nurse will communicate record and BP at office to me in 2 weeks.   HbA1c within normal limits.     Follow up as stated on visit note to check lipid panel again.

## 2025-07-09 ENCOUNTER — APPOINTMENT (OUTPATIENT)
Facility: CLINIC | Age: 40
End: 2025-07-09
Payer: COMMERCIAL